# Patient Record
Sex: FEMALE
[De-identification: names, ages, dates, MRNs, and addresses within clinical notes are randomized per-mention and may not be internally consistent; named-entity substitution may affect disease eponyms.]

---

## 2020-02-20 ENCOUNTER — APPOINTMENT (OUTPATIENT)
Dept: INTERNAL MEDICINE | Facility: CLINIC | Age: 30
End: 2020-02-20
Payer: COMMERCIAL

## 2020-02-20 VITALS
SYSTOLIC BLOOD PRESSURE: 128 MMHG | OXYGEN SATURATION: 97 % | TEMPERATURE: 98.1 F | HEIGHT: 65 IN | WEIGHT: 238 LBS | HEART RATE: 74 BPM | DIASTOLIC BLOOD PRESSURE: 85 MMHG | BODY MASS INDEX: 39.65 KG/M2

## 2020-02-20 DIAGNOSIS — F50.9 EATING DISORDER, UNSPECIFIED: ICD-10-CM

## 2020-02-20 DIAGNOSIS — Z80.3 FAMILY HISTORY OF MALIGNANT NEOPLASM OF BREAST: ICD-10-CM

## 2020-02-20 DIAGNOSIS — Z86.39 PERSONAL HISTORY OF OTHER ENDOCRINE, NUTRITIONAL AND METABOLIC DISEASE: ICD-10-CM

## 2020-02-20 DIAGNOSIS — Z84.2 FAMILY HISTORY OF OTHER DISEASES OF THE GENITOURINARY SYSTEM: ICD-10-CM

## 2020-02-20 PROCEDURE — 36415 COLL VENOUS BLD VENIPUNCTURE: CPT

## 2020-02-20 PROCEDURE — 99204 OFFICE O/P NEW MOD 45 MIN: CPT | Mod: 25

## 2020-02-20 PROCEDURE — G0447 BEHAVIOR COUNSEL OBESITY 15M: CPT

## 2020-02-20 PROCEDURE — 99385 PREV VISIT NEW AGE 18-39: CPT | Mod: 25

## 2020-02-21 LAB
25(OH)D3 SERPL-MCNC: 23.2 NG/ML
ALBUMIN SERPL ELPH-MCNC: 4.6 G/DL
ALP BLD-CCNC: 86 U/L
ALT SERPL-CCNC: 26 U/L
ANION GAP SERPL CALC-SCNC: 13 MMOL/L
APPEARANCE: CLEAR
AST SERPL-CCNC: 17 U/L
BACTERIA: NEGATIVE
BASOPHILS # BLD AUTO: 0.03 K/UL
BASOPHILS NFR BLD AUTO: 0.3 %
BILIRUB SERPL-MCNC: 0.2 MG/DL
BILIRUBIN URINE: NEGATIVE
BLOOD URINE: NEGATIVE
BUN SERPL-MCNC: 17 MG/DL
CALCIUM SERPL-MCNC: 11 MG/DL
CHLORIDE SERPL-SCNC: 106 MMOL/L
CHOLEST SERPL-MCNC: 201 MG/DL
CHOLEST/HDLC SERPL: 3.4 RATIO
CO2 SERPL-SCNC: 22 MMOL/L
COLOR: YELLOW
CREAT SERPL-MCNC: 0.64 MG/DL
EOSINOPHIL # BLD AUTO: 0.14 K/UL
EOSINOPHIL NFR BLD AUTO: 1.5 %
ESTIMATED AVERAGE GLUCOSE: 111 MG/DL
GLUCOSE QUALITATIVE U: NEGATIVE
GLUCOSE SERPL-MCNC: 97 MG/DL
HBA1C MFR BLD HPLC: 5.5 %
HCT VFR BLD CALC: 47 %
HDLC SERPL-MCNC: 59 MG/DL
HGB BLD-MCNC: 14.6 G/DL
HYALINE CASTS: 1 /LPF
IMM GRANULOCYTES NFR BLD AUTO: 0.4 %
KETONES URINE: NEGATIVE
LDLC SERPL CALC-MCNC: 118 MG/DL
LEUKOCYTE ESTERASE URINE: NEGATIVE
LYMPHOCYTES # BLD AUTO: 2.16 K/UL
LYMPHOCYTES NFR BLD AUTO: 23.8 %
MAN DIFF?: NORMAL
MCHC RBC-ENTMCNC: 29.3 PG
MCHC RBC-ENTMCNC: 31.1 GM/DL
MCV RBC AUTO: 94.4 FL
MICROSCOPIC-UA: NORMAL
MONOCYTES # BLD AUTO: 0.71 K/UL
MONOCYTES NFR BLD AUTO: 7.8 %
NEUTROPHILS # BLD AUTO: 6.01 K/UL
NEUTROPHILS NFR BLD AUTO: 66.2 %
NITRITE URINE: NEGATIVE
PH URINE: 7
PLATELET # BLD AUTO: 252 K/UL
POTASSIUM SERPL-SCNC: 4.6 MMOL/L
PROT SERPL-MCNC: 7.1 G/DL
PROTEIN URINE: NORMAL
RBC # BLD: 4.98 M/UL
RBC # FLD: 11.9 %
RED BLOOD CELLS URINE: 5 /HPF
SODIUM SERPL-SCNC: 141 MMOL/L
SPECIFIC GRAVITY URINE: 1.03
SQUAMOUS EPITHELIAL CELLS: 5 /HPF
T3FREE SERPL-MCNC: 4.72 PG/ML
T4 FREE SERPL-MCNC: 1 NG/DL
TRIGL SERPL-MCNC: 120 MG/DL
TSH SERPL-ACNC: 0.02 UIU/ML
UROBILINOGEN URINE: NORMAL
VIT B12 SERPL-MCNC: 300 PG/ML
WBC # FLD AUTO: 9.09 K/UL
WHITE BLOOD CELLS URINE: 3 /HPF

## 2020-02-24 LAB
CALCIUM SERPL-MCNC: 11.3 MG/DL
HBV DNA # SERPL NAA+PROBE: NOT DETECTED
HBV SURFACE AB SER QL: NONREACTIVE
HBV SURFACE AG SER QL: REACTIVE
HCV AB SER QL: NONREACTIVE
HCV S/CO RATIO: 0.13 S/CO
HEPB DNA PCR LOG: NOT DETECTED LOG10IU/ML
PARATHYROID HORMONE INTACT: 105 PG/ML
PHOSPHATE SERPL-MCNC: 4.2 MG/DL
THYROGLOB AB SERPL-ACNC: <20 IU/ML
THYROPEROXIDASE AB SERPL IA-ACNC: <10 IU/ML
TSI ACT/NOR SER: <0.1 IU/L

## 2020-02-25 LAB
HBV E AB SER QL: NEGATIVE
HBV E AG SER QL: NEGATIVE

## 2020-02-26 ENCOUNTER — APPOINTMENT (OUTPATIENT)
Dept: INTERNAL MEDICINE | Facility: CLINIC | Age: 30
End: 2020-02-26

## 2020-03-04 NOTE — ASSESSMENT
[FreeTextEntry1] : check labs with comprehensive hep b\par continued wt loss\par [facility]--for dermoid cyst removal in 3-4 wks\par nutritional counseling\par \par   3/4/20--ADD--PRESURGICAL LABS AND ECG REVIEWED--NO MEDICAL CONTRAINDICATIONS TO PROCEDURE

## 2020-03-04 NOTE — COUNSELING
[Potential consequences of obesity discussed] : Potential consequences of obesity discussed [Benefits of weight loss discussed] : Benefits of weight loss discussed [Structured Weight Management Program suggested:] : Structured weight management program suggested [Encouraged to maintain food diary] : Encouraged to maintain food diary [Encouraged to increase physical activity] : Encouraged to increase physical activity [Encouraged to use exercise tracking device] : Encouraged to use exercise tracking device [Needs reinforcement, provided] : Patient needs reinforcement on understanding of disease, goals and obesity follow-up plan; reinforcement was provided [FreeTextEntry4] : 15

## 2020-03-04 NOTE — HISTORY OF PRESENT ILLNESS
[FreeTextEntry1] : cpx and medical clearance\par going for presugical labs in 2 wks--Dr. Sun--dermoid cyst--scheduled for  3/15 [de-identified] : Lost 50lbs since august--wt has plauteaued for 2 months--pt only eating 1 meal/day and exercising\par labs reviewed Dr Sun--tsh mildly low\par pt purges after eating\par chronic hep B?--states she was prescribed antiviral but didn’t take it by her prior IM--abd sono done but no liver bx\par pcos?

## 2020-03-04 NOTE — HEALTH RISK ASSESSMENT
[Fully functional (bathing, dressing, toileting, transferring, walking, feeding)] : Fully functional (bathing, dressing, toileting, transferring, walking, feeding) [Fully functional (using the telephone, shopping, preparing meals, housekeeping, doing laundry, using] : Fully functional and needs no help or supervision to perform IADLs (using the telephone, shopping, preparing meals, housekeeping, doing laundry, using transportation, managing medications and managing finances) [With Patient/Caregiver] : With Patient/Caregiver [AdvancecareDate] : 2/20

## 2020-04-29 ENCOUNTER — APPOINTMENT (OUTPATIENT)
Dept: ENDOCRINOLOGY | Facility: CLINIC | Age: 30
End: 2020-04-29
Payer: COMMERCIAL

## 2020-04-29 ENCOUNTER — APPOINTMENT (OUTPATIENT)
Dept: ENDOCRINOLOGY | Facility: CLINIC | Age: 30
End: 2020-04-29

## 2020-04-29 PROCEDURE — ZZZZZ: CPT

## 2020-05-15 ENCOUNTER — APPOINTMENT (OUTPATIENT)
Dept: ENDOCRINOLOGY | Facility: CLINIC | Age: 30
End: 2020-05-15
Payer: MEDICAID

## 2020-05-15 PROCEDURE — 98968 PH1 ASSMT&MGMT NQHP 21-30: CPT

## 2020-05-15 PROCEDURE — 97803 MED NUTRITION INDIV SUBSEQ: CPT

## 2020-05-22 ENCOUNTER — APPOINTMENT (OUTPATIENT)
Dept: ENDOCRINOLOGY | Facility: CLINIC | Age: 30
End: 2020-05-22
Payer: MEDICAID

## 2020-05-22 PROCEDURE — 98968 PH1 ASSMT&MGMT NQHP 21-30: CPT

## 2020-05-22 PROCEDURE — 97803 MED NUTRITION INDIV SUBSEQ: CPT

## 2020-05-29 ENCOUNTER — APPOINTMENT (OUTPATIENT)
Dept: ENDOCRINOLOGY | Facility: CLINIC | Age: 30
End: 2020-05-29

## 2020-06-03 ENCOUNTER — APPOINTMENT (OUTPATIENT)
Dept: ENDOCRINOLOGY | Facility: CLINIC | Age: 30
End: 2020-06-03

## 2020-06-03 ENCOUNTER — APPOINTMENT (OUTPATIENT)
Dept: ENDOCRINOLOGY | Facility: CLINIC | Age: 30
End: 2020-06-03
Payer: MEDICAID

## 2020-06-03 DIAGNOSIS — E34.9 ENDOCRINE DISORDER, UNSPECIFIED: ICD-10-CM

## 2020-06-03 DIAGNOSIS — E05.90 THYROTOXICOSIS, UNSPECIFIED W/OUT THYROTOXIC CRISIS OR STORM: ICD-10-CM

## 2020-06-03 DIAGNOSIS — Z78.9 OTHER SPECIFIED HEALTH STATUS: ICD-10-CM

## 2020-06-03 DIAGNOSIS — Z86.39 PERSONAL HISTORY OF OTHER ENDOCRINE, NUTRITIONAL AND METABOLIC DISEASE: ICD-10-CM

## 2020-06-03 DIAGNOSIS — Z83.3 FAMILY HISTORY OF DIABETES MELLITUS: ICD-10-CM

## 2020-06-03 DIAGNOSIS — E55.9 VITAMIN D DEFICIENCY, UNSPECIFIED: ICD-10-CM

## 2020-06-03 PROCEDURE — 99204 OFFICE O/P NEW MOD 45 MIN: CPT | Mod: 95

## 2020-06-04 PROBLEM — Z86.39 HISTORY OF OBESITY: Status: RESOLVED | Noted: 2020-06-04 | Resolved: 2020-06-04

## 2020-06-04 PROBLEM — E34.9 ELEVATED PARATHYROID HORMONE: Status: ACTIVE | Noted: 2020-06-04

## 2020-06-04 PROBLEM — E05.90 SUBCLINICAL HYPERTHYROIDISM: Status: ACTIVE | Noted: 2020-06-04

## 2020-06-04 PROBLEM — Z78.9 NON-SMOKER: Status: ACTIVE | Noted: 2020-02-20

## 2020-06-04 PROBLEM — E55.9 VITAMIN D DEFICIENCY: Status: ACTIVE | Noted: 2020-06-04

## 2020-06-04 PROBLEM — Z83.3 FAMILY HISTORY OF DIABETES MELLITUS: Status: ACTIVE | Noted: 2020-02-20

## 2020-06-04 RX ORDER — UBIDECARENONE/VIT E ACET 100MG-5
50 MCG CAPSULE ORAL
Qty: 30 | Refills: 4 | Status: ACTIVE | COMMUNITY
Start: 2020-06-04

## 2020-06-04 NOTE — HISTORY OF PRESENT ILLNESS
[FreeTextEntry1] : 29 yr F with obesity, +Hep B, PCOS referred for subclinical hyperthyroidism and hyperparathyroidism. \par No personal or known FH  of thyroid or parathyroid disorder. \par Feb 2020\par Vitamin D 25 OH 23\par FT4 1.0\par TSH 0.02\par TSI (-)\par \par Ca 11.0 Alb 4.6\par Does not take antacids or calcium supplements. Takes Vitamin D 2000IU daily. \par No heat intolerance, diarrhea or palpitations. Recently lost 50 pounds then gained 15 pounds back during quarantine.\par No falls/fractures. No hx of kidney stones. No nausea/vomiting/joint pains. No FH of calcium disorders.\par No dysphagia, dysphonia or change in neck size. No lithium/amiodarone/HCTZ use. No eye problems. No history of thyroid nodules. \par No neck pain. Patient states she has been taking biotin supplements for hair loss. \par

## 2020-06-04 NOTE — ASSESSMENT
[FreeTextEntry1] : 29 yr old F with Obesity, Hep B, PCOS referred for subclinical hyperthyroidism and high normal calcium (10.5) in setting of elevated PTH\par 1) Elevated PTH:\par May be elevated falsely due to biotin use\par Will check PTH, CMP, Mg, Phos and Vit D 25 OH after pt stops biotin for 1 week\par Will rule out FHH and check 24 hour Urine Calcium/Creatinine ratio\par If true elevation of PTH is found will check Renal US, BMD.\par 2) Subclinical hyperthyroidism\par May be falsely low due to biotin use\par Will check repeat TSH, FT4 after stoppage of biotin for 1 week\par Check Thyroid US\par 3) Vitamin D deficiency\par Continue Vit D 2000 IU daily\par 4) PCOS\par Mgmt as per GYN\par \par F/U in 6 weeks

## 2020-06-04 NOTE — REVIEW OF SYSTEMS
[Fatigue] : fatigue [Recent Weight Gain (___ Lbs)] : recent weight gain: [unfilled] lbs [All other systems negative] : All other systems negative [Nausea] : no nausea [Dysphagia] : no dysphagia [Dysphonia] : no dysphonia [Visual Field Defect] : no visual field defect [Joint Pain] : no joint pain [Headaches] : no headaches [Vomiting] : no vomiting [Tremors] : no tremors [Depression] : no depression [Polydipsia] : no polydipsia [Cold Intolerance] : no cold intolerance [Easy Bleeding] : no ~M tendency for easy bleeding [Heat Intolerance] : no heat intolerance [Easy Bruising] : no tendency for easy bruising

## 2020-07-22 ENCOUNTER — APPOINTMENT (OUTPATIENT)
Dept: ENDOCRINOLOGY | Facility: CLINIC | Age: 30
End: 2020-07-22

## 2020-08-25 ENCOUNTER — APPOINTMENT (OUTPATIENT)
Dept: INTERNAL MEDICINE | Facility: CLINIC | Age: 30
End: 2020-08-25

## 2020-08-26 ENCOUNTER — APPOINTMENT (OUTPATIENT)
Dept: ENDOCRINOLOGY | Facility: CLINIC | Age: 30
End: 2020-08-26

## 2020-08-26 ENCOUNTER — APPOINTMENT (OUTPATIENT)
Dept: INTERNAL MEDICINE | Facility: CLINIC | Age: 30
End: 2020-08-26
Payer: MEDICAID

## 2020-08-26 VITALS
WEIGHT: 258.13 LBS | OXYGEN SATURATION: 97 % | SYSTOLIC BLOOD PRESSURE: 129 MMHG | RESPIRATION RATE: 15 BRPM | DIASTOLIC BLOOD PRESSURE: 74 MMHG | HEIGHT: 65 IN | TEMPERATURE: 98.4 F | HEART RATE: 90 BPM | BODY MASS INDEX: 43.01 KG/M2

## 2020-08-26 DIAGNOSIS — R21 RASH AND OTHER NONSPECIFIC SKIN ERUPTION: ICD-10-CM

## 2020-08-26 DIAGNOSIS — E83.52 HYPERCALCEMIA: ICD-10-CM

## 2020-08-26 PROCEDURE — 36415 COLL VENOUS BLD VENIPUNCTURE: CPT

## 2020-08-26 PROCEDURE — 99213 OFFICE O/P EST LOW 20 MIN: CPT | Mod: 25

## 2020-08-26 NOTE — ASSESSMENT
[FreeTextEntry1] : prescribed pred taper\par addn labs for hepatitis\par derm if not better 3-4 days

## 2020-08-26 NOTE — PHYSICAL EXAM
[Normal] : no respiratory distress, lungs were clear to auscultation bilaterally and no accessory muscle use [de-identified] : fine macular pap rash over trunk and extremities--no hives

## 2020-08-26 NOTE — HISTORY OF PRESENT ILLNESS
[FreeTextEntry1] : rash [de-identified] : pruritic rash for 1 wk--genearlized\par had hair permed 24hrs before rash started--no other new meds or exposures--had exposure to a friends dog a week prior\par given oral steroid by UC on 8/25 but didn’t take it\par reviewed endo notes\par pt now states she take antiviral for hepatitis given by her prior MD--partial course

## 2020-08-27 LAB
HAV IGM SER QL: NONREACTIVE
HBV CORE IGG+IGM SER QL: REACTIVE
HBV CORE IGM SER QL: NONREACTIVE
HBV SURFACE AB SER QL: NONREACTIVE
HBV SURFACE AG SER QL: REACTIVE
HEPATITIS A IGG ANTIBODY: REACTIVE
INR PPP: 0.9 RATIO
PT BLD: 10.8 SEC

## 2020-12-11 ENCOUNTER — APPOINTMENT (OUTPATIENT)
Dept: INTERNAL MEDICINE | Facility: CLINIC | Age: 30
End: 2020-12-11
Payer: MEDICAID

## 2020-12-11 VITALS
WEIGHT: 237.31 LBS | BODY MASS INDEX: 39.54 KG/M2 | HEART RATE: 85 BPM | DIASTOLIC BLOOD PRESSURE: 83 MMHG | HEIGHT: 65 IN | OXYGEN SATURATION: 99 % | SYSTOLIC BLOOD PRESSURE: 118 MMHG | TEMPERATURE: 98.7 F

## 2020-12-11 PROCEDURE — 99213 OFFICE O/P EST LOW 20 MIN: CPT

## 2020-12-11 PROCEDURE — 99072 ADDL SUPL MATRL&STAF TM PHE: CPT

## 2020-12-11 NOTE — ASSESSMENT
[FreeTextEntry1] : pt to get further info on diet history and give to provider for letter of neccesity\par referral to cardio, pulm ,gi\par f/u 1 month for reweigh

## 2020-12-11 NOTE — HISTORY OF PRESENT ILLNESS
[FreeTextEntry1] : obesity [de-identified] : saw dr Goodman --bariatric surgeon\par req letter of medical necessity for obesity surgery--[phone]\par fax--[phone]\par pt has had multiple unsucc. attempts at wt loss

## 2020-12-14 ENCOUNTER — APPOINTMENT (OUTPATIENT)
Dept: PULMONOLOGY | Facility: CLINIC | Age: 30
End: 2020-12-14
Payer: MEDICAID

## 2020-12-14 VITALS
OXYGEN SATURATION: 100 % | HEART RATE: 88 BPM | BODY MASS INDEX: 44.15 KG/M2 | SYSTOLIC BLOOD PRESSURE: 138 MMHG | RESPIRATION RATE: 15 BRPM | DIASTOLIC BLOOD PRESSURE: 75 MMHG | HEIGHT: 65 IN | TEMPERATURE: 98.9 F | WEIGHT: 265 LBS

## 2020-12-14 DIAGNOSIS — R06.83 SNORING: ICD-10-CM

## 2020-12-14 DIAGNOSIS — Z23 ENCOUNTER FOR IMMUNIZATION: ICD-10-CM

## 2020-12-14 PROCEDURE — 99072 ADDL SUPL MATRL&STAF TM PHE: CPT

## 2020-12-14 PROCEDURE — 99204 OFFICE O/P NEW MOD 45 MIN: CPT

## 2020-12-14 RX ORDER — PREDNISONE 20 MG/1
20 TABLET ORAL
Qty: 10 | Refills: 0 | Status: DISCONTINUED | COMMUNITY
Start: 2020-08-26 | End: 2020-12-14

## 2020-12-14 RX ORDER — FLUTICASONE PROPIONATE 50 UG/1
50 SPRAY, METERED NASAL
Qty: 16 | Refills: 0 | Status: DISCONTINUED | COMMUNITY
Start: 2020-11-06

## 2020-12-14 RX ORDER — METHYLPREDNISOLONE 4 MG/1
4 TABLET ORAL
Qty: 21 | Refills: 0 | Status: DISCONTINUED | COMMUNITY
Start: 2020-09-11

## 2020-12-14 NOTE — PHYSICAL EXAM
[No Acute Distress] : no acute distress [Normal Oropharynx] : normal oropharynx [IV] : Mallampati Class: IV [Normal Appearance] : normal appearance [No Neck Mass] : no neck mass [Normal Rate/Rhythm] : normal rate/rhythm [Normal S1, S2] : normal s1, s2 [No Murmurs] : no murmurs [No Resp Distress] : no resp distress [Clear to Auscultation Bilaterally] : clear to auscultation bilaterally [No Abnormalities] : no abnormalities [Benign] : benign [Normal Gait] : normal gait [No Clubbing] : no clubbing [No Cyanosis] : no cyanosis [No Edema] : no edema [FROM] : FROM [Normal Color/ Pigmentation] : normal color/ pigmentation [No Focal Deficits] : no focal deficits [Oriented x3] : oriented x3 [Normal Affect] : normal affect [TextBox_2] : obese

## 2020-12-14 NOTE — HISTORY OF PRESENT ILLNESS
[Awakes Unrefreshed] : awakes unrefreshed [Awakes with Dry Mouth] : awakes with dry mouth [Awakes with Headache] : awakes with headache [Daytime Somnolence] : daytime somnolence [Fatigue] : fatigue [Recent  Weight Gain] : recent  weight gain [Snoring] : snoring [Tired while Driving] : tired while driving [DMS] : difficulty maintaining sleep [Never] : never [TextBox_4] : Ms. NIKOLAS GOLDMAN is a 29 year old woman with DM and obesity here for evaluation prior to bariatric surgery. \par \par Patient denies any pulmonary sx - no hx of asthma, wheezing, no SOB, no chest tightness. \par Does have s/s of sleep disordered breathing.  \par \par PMH: obesity; DM\par Meds: Metformin\par All: NKDA\par SH: not a smoker\par FH grandparents have DM; dad - obesity, DM\par PMD: COMPA MANRIQUE\par Immunizations: needs to get at the pharmacy\par  [ESS] : 7

## 2020-12-14 NOTE — ASSESSMENT
[FreeTextEntry1] : 28 yo with obesity here for evaluation prior to bariatric surgery (no scheduled date - planned for about 3 months from now). \par The patient has multiple signs and symptoms of sleep-disordered breathing. She was referred to the [facility] for a diagnostic HST. The ramifications of ERIC and its potential therapeutic modalities were discussed with the patient. The patient was cautioned on the importance of avoiding drowsy driving.\par Will schedule PFTs\par \par I will contact patient after sleep study and PFTs.\par \par All questions answered. Patient in agreement with plan. \par \par \par

## 2020-12-14 NOTE — CONSULT LETTER
[Dear  ___] : Dear  [unfilled], [Consult Letter:] : I had the pleasure of evaluating your patient, [unfilled]. [Please see my note below.] : Please see my note below. [Sincerely,] : Sincerely, [FreeTextEntry3] : Sincerely,\par \par Nalini Cavazos MD\par [facility]\par Pulmonary Medicine\par

## 2020-12-18 ENCOUNTER — APPOINTMENT (OUTPATIENT)
Dept: CARDIOLOGY | Facility: CLINIC | Age: 30
End: 2020-12-18
Payer: MEDICAID

## 2020-12-18 ENCOUNTER — NON-APPOINTMENT (OUTPATIENT)
Age: 30
End: 2020-12-18

## 2020-12-18 VITALS
WEIGHT: 263 LBS | TEMPERATURE: 98.7 F | BODY MASS INDEX: 43.82 KG/M2 | SYSTOLIC BLOOD PRESSURE: 132 MMHG | DIASTOLIC BLOOD PRESSURE: 84 MMHG | OXYGEN SATURATION: 99 % | HEIGHT: 65 IN | HEART RATE: 86 BPM

## 2020-12-18 PROCEDURE — 99072 ADDL SUPL MATRL&STAF TM PHE: CPT

## 2020-12-18 PROCEDURE — 99204 OFFICE O/P NEW MOD 45 MIN: CPT

## 2020-12-18 PROCEDURE — 93000 ELECTROCARDIOGRAM COMPLETE: CPT

## 2020-12-18 NOTE — HISTORY OF PRESENT ILLNESS
[FreeTextEntry1] : 29F presents to establish cardiovascular care\par Sent in by PMD: Dr Rahul Singh\par \par pt is planning for bariatric surgery\par \par pt overall feeling well, denies cp and sob, at rest or on exertion, denies palpitations dizziness syncope, LE edema, orthopnea. \par pt exercises, does StairMaster, several times a week, denies sob, cp, states her exercise tolerance has been improving\par pt currently doing intermittent fasting, states it has helped her maintain her weight, but she has not lost weight on it, but also states that she needs to make better choices at what she eats. \par \par pt had covid several months ago. guerra not know if she has antibodies or not. \par \par Prior cardiac workup: none\par Recent labs: 2/2020: a1c 5.5 Cr 0.64 tot chol 201 tg 120 hdl 59 ldl 118 tsh 0.02\par \par Med hx: PCOS, hepatitis B, BMI 43\par OBGYN hx: does not have children.irregular menstrual cycles\par Sx hx: ovarian cyst removal\par Fam hx: no known cardiac hx\par Social hx: lives in [place], not currently working, denies tob, rare etoh, denies drug use. \par Meds: metformin\par Allergies: nkda\par \par

## 2020-12-18 NOTE — PHYSICAL EXAM
[General Appearance - Well Developed] : well developed [Normal Appearance] : normal appearance [General Appearance - Well Nourished] : well nourished [FreeTextEntry1] : jvd not appreciated [Heart Rate And Rhythm] : heart rate and rhythm were normal [Heart Sounds] : normal S1 and S2 [Murmurs] : no murmurs present [Edema] : no peripheral edema present [] : no respiratory distress [Respiration, Rhythm And Depth] : normal respiratory rhythm and effort [Auscultation Breath Sounds / Voice Sounds] : lungs were clear to auscultation bilaterally [Bowel Sounds] : normal bowel sounds [Abdomen Soft] : soft [Abdomen Tenderness] : non-tender [Abnormal Walk] : normal gait [Skin Turgor] : normal skin turgor [Oriented To Time, Place, And Person] : oriented to person, place, and time [Impaired Insight] : insight and judgment were intact [Affect] : the affect was normal [Mood] : the mood was normal

## 2020-12-18 NOTE — DISCUSSION/SUMMARY
[FreeTextEntry1] : 29F with hypothyroidism, BMI 43, presents to establish cardiovascular care\par \par 1. elevated BMI\par -BP stable, pt trying to eat healthier, exercise more\par -pt is planning on bariatric surgery, does not have a scheduled date yet, states she is in the preliminary stages\par -d/w that she will likely require pre-operative testing, likely TTE and stress test\par -pt states she wants to wait until she has a date prior to scheduling\par \par \par f/u prior to procedure.

## 2020-12-22 ENCOUNTER — NON-APPOINTMENT (OUTPATIENT)
Age: 30
End: 2020-12-22

## 2021-01-08 ENCOUNTER — OUTPATIENT (OUTPATIENT)
Dept: OUTPATIENT SERVICES | Facility: HOSPITAL | Age: 31
LOS: 1 days | End: 2021-01-08
Payer: MEDICAID

## 2021-01-08 ENCOUNTER — APPOINTMENT (OUTPATIENT)
Dept: SLEEP CENTER | Facility: CLINIC | Age: 31
End: 2021-01-08
Payer: MEDICAID

## 2021-01-08 PROCEDURE — 95806 SLEEP STUDY UNATT&RESP EFFT: CPT

## 2021-01-08 PROCEDURE — 95806 SLEEP STUDY UNATT&RESP EFFT: CPT | Mod: 26

## 2021-01-14 DIAGNOSIS — G47.33 OBSTRUCTIVE SLEEP APNEA (ADULT) (PEDIATRIC): ICD-10-CM

## 2021-01-15 ENCOUNTER — APPOINTMENT (OUTPATIENT)
Dept: CARDIOLOGY | Facility: CLINIC | Age: 31
End: 2021-01-15
Payer: MEDICAID

## 2021-01-15 VITALS
OXYGEN SATURATION: 99 % | DIASTOLIC BLOOD PRESSURE: 76 MMHG | BODY MASS INDEX: 44.82 KG/M2 | HEIGHT: 65 IN | RESPIRATION RATE: 16 BRPM | SYSTOLIC BLOOD PRESSURE: 119 MMHG | WEIGHT: 269 LBS | TEMPERATURE: 98.8 F | HEART RATE: 85 BPM

## 2021-01-15 DIAGNOSIS — Z00.00 ENCOUNTER FOR GENERAL ADULT MEDICAL EXAMINATION W/OUT ABNORMAL FINDINGS: ICD-10-CM

## 2021-01-15 PROCEDURE — 99072 ADDL SUPL MATRL&STAF TM PHE: CPT

## 2021-01-15 PROCEDURE — 99214 OFFICE O/P EST MOD 30 MIN: CPT

## 2021-01-15 NOTE — HISTORY OF PRESENT ILLNESS
Blood Sent    [FreeTextEntry1] : 30F presents for f/u\par PMD: Dr Rahul Singh\par \par \par \par pt last seen in cardiology for an initial eval on 12/18/2020 to discuss a potential bariatric surgery. at that time pt was feeling well without CP sob at rest of on exertion. \par \par today pt presents for f/u. pt overall feeling well, denies cp and sob, at rest or on exertion, denies palpitations dizziness syncope, LE edema, orthopnea. \par pt exercises, does StairMaster, spinning, several times a week, denies sob, cp, states her exercise tolerance has been improving\par pt currently trying to eliminate carbs from her diet, prev was doing intermittent fasting.\par pt now has a date for her bariatric surgery, for march and wants to discuss pre-op evaluation. \par \par pt had covid several months ago. does not know if she has antibodies or not. \par \par Prior cardiac workup: none\par Recent labs: 2/2020: a1c 5.5 Cr 0.64 tot chol 201 tg 120 hdl 59 ldl 118 tsh 0.02\par \par Med hx: PCOS, hepatitis B, BMI 43\par OBGYN hx: does not have children.irregular menstrual cycles\par Sx hx: ovarian cyst removal\par Fam hx: no known cardiac hx\par Social hx: lives in [place], not currently working, denies tob, rare etoh, denies drug use. \par Meds: metformin\par Allergies: nkda\par

## 2021-01-15 NOTE — DISCUSSION/SUMMARY
[FreeTextEntry1] : 30F with hypothyroidism, BMI 43, presents for f/u\par \par 1. elevated BMI\par -BP stable, pt trying to eat healthier, exercise more\par -pt is planning on bariatric surgery, requires tte and stress\par \par \par

## 2021-01-15 NOTE — PHYSICAL EXAM
[General Appearance - Well Developed] : well developed [Normal Appearance] : normal appearance [General Appearance - Well Nourished] : well nourished [FreeTextEntry1] : jvd not appreciated [] : no respiratory distress [Respiration, Rhythm And Depth] : normal respiratory rhythm and effort [Auscultation Breath Sounds / Voice Sounds] : lungs were clear to auscultation bilaterally [Heart Rate And Rhythm] : heart rate and rhythm were normal [Murmurs] : no murmurs present [Heart Sounds] : normal S1 and S2 [Edema] : no peripheral edema present [Bowel Sounds] : normal bowel sounds [Abdomen Soft] : soft [Abdomen Tenderness] : non-tender [Abnormal Walk] : normal gait [Skin Turgor] : normal skin turgor [Oriented To Time, Place, And Person] : oriented to person, place, and time [Impaired Insight] : insight and judgment were intact [Affect] : the affect was normal [Mood] : the mood was normal

## 2021-01-20 ENCOUNTER — APPOINTMENT (OUTPATIENT)
Dept: PULMONOLOGY | Facility: CLINIC | Age: 31
End: 2021-01-20

## 2021-01-31 DIAGNOSIS — Z01.818 ENCOUNTER FOR OTHER PREPROCEDURAL EXAMINATION: ICD-10-CM

## 2021-02-02 ENCOUNTER — APPOINTMENT (OUTPATIENT)
Dept: CARDIOLOGY | Facility: CLINIC | Age: 31
End: 2021-02-02
Payer: MEDICAID

## 2021-02-02 ENCOUNTER — APPOINTMENT (OUTPATIENT)
Dept: DISASTER EMERGENCY | Facility: CLINIC | Age: 31
End: 2021-02-02

## 2021-02-02 ENCOUNTER — APPOINTMENT (OUTPATIENT)
Dept: PULMONOLOGY | Facility: CLINIC | Age: 31
End: 2021-02-02
Payer: MEDICAID

## 2021-02-02 VITALS — SYSTOLIC BLOOD PRESSURE: 128 MMHG | HEART RATE: 94 BPM | DIASTOLIC BLOOD PRESSURE: 88 MMHG

## 2021-02-02 PROCEDURE — 93015 CV STRESS TEST SUPVJ I&R: CPT

## 2021-02-02 PROCEDURE — 99213 OFFICE O/P EST LOW 20 MIN: CPT | Mod: 25

## 2021-02-02 PROCEDURE — 93306 TTE W/DOPPLER COMPLETE: CPT

## 2021-02-02 PROCEDURE — 99072 ADDL SUPL MATRL&STAF TM PHE: CPT

## 2021-02-02 PROCEDURE — 99204 OFFICE O/P NEW MOD 45 MIN: CPT | Mod: 95

## 2021-02-02 NOTE — REVIEW OF SYSTEMS
[EDS: ESS=____] : daytime somnolence: ESS=[unfilled] [Snoring] : snoring [Witnessed Apneas] : witnessed apnea [A.M. Dry Mouth] : a.m. dry mouth [Obesity] : obesity [A.M. Headache] : headache present upon awakening [Difficulty Maintaining Sleep] : difficulty maintaining sleep [Negative] : Psychiatric [Fatigue] : no fatigue [Leg Dysesthesias] : no leg dysesthesias [Difficulty Initiating Sleep] : no difficulty falling asleep [Lower Extremity Discomfort] : no lower extremity discomfort [Irresistible urge to move legs] : no irresistible urge to move legs because of lower extremity discomfort [Late day/ Evening symptoms] : no late day/evening symptoms [Sleep Disturbances due to LE symptoms] : ~T no sleep disturbances due to lower extremity symptoms [Unusual Sleep Behavior] : no unusual sleep behavior [Hypersomnolence] : not sleeping much more than usual [Cataplexy] :  no cataplexy [Hypnogogic Hallucinations] : no hypnogogic hallucinations [Hypnopompic Hallucinations] : no hypnopompic hallucinations [Sleep Paralysis] : no sleep paralysis

## 2021-02-02 NOTE — ASSESSMENT
[FreeTextEntry1] : Ms. James is a 30 year old female here for preop evaluation for gastric sleeve surgery and for treatment of her ERIC. \par \par We discussed results of HST which showed an MANDA of 41/hr. She is agreeable for treatment. Equipment will be ordered through the DME provider. The compliance criteria were discussed. She was explained why she would benefit from treatment prior to her surgery and help her preoperatively. The order will be rushed. \par \par Patient with low risk for pulmonary complications but will need PFTs to further evaluate in order to clear her for surgery. \par \par I have asked her to see me March 3rd which gives her surgeon 1 week notice for clearance. She will perform PFTs during that day or before. \par

## 2021-02-02 NOTE — PHYSICAL EXAM
[General Appearance - Well Developed] : well developed [Normal Appearance] : normal appearance [General Appearance - Well Nourished] : well nourished [Oriented To Time, Place, And Person] : oriented to person, place, and time [Impaired Insight] : insight and judgment were intact [Affect] : the affect was normal

## 2021-02-02 NOTE — HISTORY OF PRESENT ILLNESS
[Home] : at home, [unfilled] , at the time of the visit. [Other Location: e.g. Home (Enter Location, City,State)___] : at [unfilled] [Verbal consent obtained from patient] : the patient, [unfilled] [Obstructive Sleep Apnea] : obstructive sleep apnea [Snoring] : snoring [Witnessed Apneas] : witnessed sleep apnea [Frequent Nocturnal Awakening] : frequent nocturnal awakening [ESS: ___] : ESS score [unfilled] [Awakes Unrefreshed] : awakening unrefreshed [Awakening With Dry Mouth] : awakening with dry mouth [Recent  Weight Gain] : recent weight gain [DMS] : DMS [Date: ___] : Date of most recent diagnostic polysomnogram: [unfilled] [AHI: ___ per hour] : Apnea-hypopnea index:  [unfilled] per hour [FreeTextEntry1] : Ms. James is a 30 year old female with a history of class III obesity and ERIC who comes in for preoperative evaluation and treatment of ERIC. Per the patietn she was offered an HST for clearance and she was found to have severe ERIC. She does have loud snoring, nonrestorative sleep, and frequent nocturnal awakenings. She is scheduled to receive her gastric sleeve surgery by Dr. Yury Sanchez on March 10th 2021. \par \par In terms of her pulmonary status she is a current nonsmoker. She denies any exercise intolerance. She had minor surgery before (ovarian cysts) but had not complications with anesthesia. \par \par Job: Unemployed, Unemployed \par \par ______________________________________________________________________________________ \par EPWORTH SLEEPINESS SCALE \par How likely are you to doze off or fall asleep in the situations described below, in contrast to feeling just tired? \par This refers to your usual way of life in recent times. \par Even if you haven't done some of these things recently, try to work out how they would have affected you. \par Use the following scale to choose one most appropriate number for each situation. \par \par 0 = Never would doze \par 1 = Slight chance of dozing \par 2 = Moderate chance of dozing \par 3 = High chance of dozing \par \par  \par 1........................................Sitting and reading \par 1........................................Watching TV \par 1........................................Sitting inactive in a public place (eg a theatre or a meeting) \par 0........................................As a passenger in a car for an hour without a break \par 3........................................Lying down to rest in the afternoon when circumstances permit \par 0........................................Sitting and talking to someone \par 0........................................Sitting quietly after lunch without alcohol \par 0........................................In a car, while stopped for a few minutes in traffic \par 6........................................TOTAL SCORE \par ______________________________________________________________________________________ \par \par  [Daytime Somnolence] : no daytime somnolence [Unintentional Sleep while Active] : no unintentional sleep while active [Unintentional Sleep While Inactive] : no unintentional sleep while inactive [Awakes with Headache] : no headache upon awakening [DIS] : no DIS [Unusual Sleep Behavior] : no unusual sleep behavior [Hypersomnolence] : no hypersomnolence [Cataplexy] : no cataplexy [Sleep Paralysis] : no sleep paralysis [Hypnagogic Hallucinations] : no hallucinations when falling asleep [Hypnopompic Hallucinations] : no hallucinations when awakening

## 2021-02-02 NOTE — REASON FOR VISIT
[Initial Evaluation] : an initial evaluation [Sleep Apnea] : sleep apnea [FreeTextEntry2] : preoperative evaluation

## 2021-02-03 LAB — SARS-COV-2 N GENE NPH QL NAA+PROBE: NOT DETECTED

## 2021-02-04 ENCOUNTER — APPOINTMENT (OUTPATIENT)
Dept: PULMONOLOGY | Facility: CLINIC | Age: 31
End: 2021-02-04
Payer: MEDICAID

## 2021-02-04 PROCEDURE — 94010 BREATHING CAPACITY TEST: CPT

## 2021-02-04 PROCEDURE — 94729 DIFFUSING CAPACITY: CPT

## 2021-02-04 PROCEDURE — 94726 PLETHYSMOGRAPHY LUNG VOLUMES: CPT

## 2021-02-04 PROCEDURE — 99072 ADDL SUPL MATRL&STAF TM PHE: CPT

## 2021-02-04 NOTE — ASU PATIENT PROFILE, ADULT - PMH
Hepatitis    Obesity    ERIC (obstructive sleep apnea)     Hepatitis    Obesity    ERIC (obstructive sleep apnea)    Polycystic ovarian disease

## 2021-02-05 ENCOUNTER — OUTPATIENT (OUTPATIENT)
Dept: OUTPATIENT SERVICES | Facility: HOSPITAL | Age: 31
LOS: 1 days | Discharge: HOME | End: 2021-02-05
Payer: MEDICAID

## 2021-02-05 ENCOUNTER — RESULT REVIEW (OUTPATIENT)
Age: 31
End: 2021-02-05

## 2021-02-05 VITALS
OXYGEN SATURATION: 100 % | HEIGHT: 64 IN | TEMPERATURE: 98 F | WEIGHT: 266.1 LBS | DIASTOLIC BLOOD PRESSURE: 81 MMHG | RESPIRATION RATE: 14 BRPM | HEART RATE: 83 BPM | SYSTOLIC BLOOD PRESSURE: 132 MMHG

## 2021-02-05 DIAGNOSIS — K21.9 GASTRO-ESOPHAGEAL REFLUX DISEASE WITHOUT ESOPHAGITIS: ICD-10-CM

## 2021-02-05 DIAGNOSIS — E66.01 MORBID (SEVERE) OBESITY DUE TO EXCESS CALORIES: ICD-10-CM

## 2021-02-05 DIAGNOSIS — N83.209 UNSPECIFIED OVARIAN CYST, UNSPECIFIED SIDE: Chronic | ICD-10-CM

## 2021-02-05 LAB — HCG UR QL: NEGATIVE — SIGNIFICANT CHANGE UP

## 2021-02-05 PROCEDURE — 88342 IMHCHEM/IMCYTCHM 1ST ANTB: CPT

## 2021-02-05 PROCEDURE — 88313 SPECIAL STAINS GROUP 2: CPT

## 2021-02-05 PROCEDURE — 88313 SPECIAL STAINS GROUP 2: CPT | Mod: 26

## 2021-02-05 PROCEDURE — 88305 TISSUE EXAM BY PATHOLOGIST: CPT

## 2021-02-05 PROCEDURE — 88312 SPECIAL STAINS GROUP 1: CPT

## 2021-02-05 PROCEDURE — 88305 TISSUE EXAM BY PATHOLOGIST: CPT | Mod: 26

## 2021-02-05 PROCEDURE — 88312 SPECIAL STAINS GROUP 1: CPT | Mod: 26

## 2021-02-05 PROCEDURE — 81025 URINE PREGNANCY TEST: CPT

## 2021-02-05 PROCEDURE — 88342 IMHCHEM/IMCYTCHM 1ST ANTB: CPT | Mod: 26

## 2021-02-11 DIAGNOSIS — Z01.818 ENCOUNTER FOR OTHER PREPROCEDURAL EXAMINATION: ICD-10-CM

## 2021-02-11 DIAGNOSIS — K29.50 UNSPECIFIED CHRONIC GASTRITIS WITHOUT BLEEDING: ICD-10-CM

## 2021-02-11 DIAGNOSIS — E66.9 OBESITY, UNSPECIFIED: ICD-10-CM

## 2021-02-11 DIAGNOSIS — K20.90 ESOPHAGITIS, UNSPECIFIED WITHOUT BLEEDING: ICD-10-CM

## 2021-02-11 DIAGNOSIS — B96.81 HELICOBACTER PYLORI [H. PYLORI] AS THE CAUSE OF DISEASES CLASSIFIED ELSEWHERE: ICD-10-CM

## 2021-02-11 DIAGNOSIS — G47.33 OBSTRUCTIVE SLEEP APNEA (ADULT) (PEDIATRIC): ICD-10-CM

## 2021-02-11 DIAGNOSIS — Z79.84 LONG TERM (CURRENT) USE OF ORAL HYPOGLYCEMIC DRUGS: ICD-10-CM

## 2021-02-11 DIAGNOSIS — E28.2 POLYCYSTIC OVARIAN SYNDROME: ICD-10-CM

## 2021-02-15 PROBLEM — E66.9 OBESITY, UNSPECIFIED: Chronic | Status: ACTIVE | Noted: 2021-02-05

## 2021-02-15 PROBLEM — K75.9 INFLAMMATORY LIVER DISEASE, UNSPECIFIED: Chronic | Status: ACTIVE | Noted: 2021-02-04

## 2021-02-19 ENCOUNTER — APPOINTMENT (OUTPATIENT)
Dept: GASTROENTEROLOGY | Facility: CLINIC | Age: 31
End: 2021-02-19

## 2021-03-02 ENCOUNTER — OUTPATIENT (OUTPATIENT)
Dept: OUTPATIENT SERVICES | Facility: HOSPITAL | Age: 31
LOS: 1 days | End: 2021-03-02
Payer: MEDICAID

## 2021-03-02 ENCOUNTER — RESULT REVIEW (OUTPATIENT)
Age: 31
End: 2021-03-02

## 2021-03-02 VITALS
HEART RATE: 68 BPM | SYSTOLIC BLOOD PRESSURE: 121 MMHG | RESPIRATION RATE: 18 BRPM | HEIGHT: 64 IN | TEMPERATURE: 98 F | WEIGHT: 250 LBS | OXYGEN SATURATION: 98 % | DIASTOLIC BLOOD PRESSURE: 83 MMHG

## 2021-03-02 DIAGNOSIS — E66.01 MORBID (SEVERE) OBESITY DUE TO EXCESS CALORIES: ICD-10-CM

## 2021-03-02 DIAGNOSIS — N83.209 UNSPECIFIED OVARIAN CYST, UNSPECIFIED SIDE: Chronic | ICD-10-CM

## 2021-03-02 DIAGNOSIS — Z29.9 ENCOUNTER FOR PROPHYLACTIC MEASURES, UNSPECIFIED: ICD-10-CM

## 2021-03-02 LAB
A1C WITH ESTIMATED AVERAGE GLUCOSE RESULT: 5.6 % — SIGNIFICANT CHANGE UP (ref 4–5.6)
ALBUMIN SERPL ELPH-MCNC: 4.3 G/DL — SIGNIFICANT CHANGE UP (ref 3.3–5)
ANION GAP SERPL CALC-SCNC: 6 MMOL/L — SIGNIFICANT CHANGE UP (ref 5–17)
APPEARANCE UR: CLEAR — SIGNIFICANT CHANGE UP
APTT BLD: 34.3 SEC — SIGNIFICANT CHANGE UP (ref 27.5–35.5)
BASOPHILS # BLD AUTO: 0.02 K/UL — SIGNIFICANT CHANGE UP (ref 0–0.2)
BASOPHILS NFR BLD AUTO: 0.4 % — SIGNIFICANT CHANGE UP (ref 0–2)
BILIRUB UR-MCNC: NEGATIVE — SIGNIFICANT CHANGE UP
BLOOD GAS SOURCE: SIGNIFICANT CHANGE UP
BUN SERPL-MCNC: 9 MG/DL — SIGNIFICANT CHANGE UP (ref 7–23)
CALCIUM SERPL-MCNC: 10.3 MG/DL — HIGH (ref 8.5–10.1)
CHLORIDE SERPL-SCNC: 107 MMOL/L — SIGNIFICANT CHANGE UP (ref 96–108)
CO2 SERPL-SCNC: 27 MMOL/L — SIGNIFICANT CHANGE UP (ref 22–31)
COHGB MFR BLDV: 1.6 % — HIGH (ref 0–1.5)
COLOR SPEC: YELLOW — SIGNIFICANT CHANGE UP
CREAT SERPL-MCNC: 0.64 MG/DL — SIGNIFICANT CHANGE UP (ref 0.5–1.3)
DIFF PNL FLD: NEGATIVE — SIGNIFICANT CHANGE UP
EOSINOPHIL # BLD AUTO: 0.1 K/UL — SIGNIFICANT CHANGE UP (ref 0–0.5)
EOSINOPHIL NFR BLD AUTO: 1.8 % — SIGNIFICANT CHANGE UP (ref 0–6)
ESTIMATED AVERAGE GLUCOSE: 114 MG/DL — SIGNIFICANT CHANGE UP (ref 68–114)
GLUCOSE SERPL-MCNC: 97 MG/DL — SIGNIFICANT CHANGE UP (ref 70–99)
GLUCOSE UR QL: NEGATIVE MG/DL — SIGNIFICANT CHANGE UP
HCT VFR BLD CALC: 44.5 % — SIGNIFICANT CHANGE UP (ref 34.5–45)
HGB BLD-MCNC: 14.7 G/DL — SIGNIFICANT CHANGE UP (ref 11.5–15.5)
IMM GRANULOCYTES NFR BLD AUTO: 0.4 % — SIGNIFICANT CHANGE UP (ref 0–1.5)
INR BLD: 1.02 RATIO — SIGNIFICANT CHANGE UP (ref 0.88–1.16)
KETONES UR-MCNC: ABNORMAL
LEUKOCYTE ESTERASE UR-ACNC: NEGATIVE — SIGNIFICANT CHANGE UP
LYMPHOCYTES # BLD AUTO: 1.35 K/UL — SIGNIFICANT CHANGE UP (ref 1–3.3)
LYMPHOCYTES # BLD AUTO: 23.9 % — SIGNIFICANT CHANGE UP (ref 13–44)
MCHC RBC-ENTMCNC: 29.2 PG — SIGNIFICANT CHANGE UP (ref 27–34)
MCHC RBC-ENTMCNC: 33 GM/DL — SIGNIFICANT CHANGE UP (ref 32–36)
MCV RBC AUTO: 88.3 FL — SIGNIFICANT CHANGE UP (ref 80–100)
MONOCYTES # BLD AUTO: 0.43 K/UL — SIGNIFICANT CHANGE UP (ref 0–0.9)
MONOCYTES NFR BLD AUTO: 7.6 % — SIGNIFICANT CHANGE UP (ref 2–14)
NEUTROPHILS # BLD AUTO: 3.74 K/UL — SIGNIFICANT CHANGE UP (ref 1.8–7.4)
NEUTROPHILS NFR BLD AUTO: 65.9 % — SIGNIFICANT CHANGE UP (ref 43–77)
NITRITE UR-MCNC: NEGATIVE — SIGNIFICANT CHANGE UP
PH UR: 6 — SIGNIFICANT CHANGE UP (ref 5–8)
PLATELET # BLD AUTO: 198 K/UL — SIGNIFICANT CHANGE UP (ref 150–400)
POTASSIUM SERPL-MCNC: 3.8 MMOL/L — SIGNIFICANT CHANGE UP (ref 3.5–5.3)
POTASSIUM SERPL-SCNC: 3.8 MMOL/L — SIGNIFICANT CHANGE UP (ref 3.5–5.3)
PROT UR-MCNC: NEGATIVE MG/DL — SIGNIFICANT CHANGE UP
PROTHROM AB SERPL-ACNC: 11.8 SEC — SIGNIFICANT CHANGE UP (ref 10.6–13.6)
RBC # BLD: 5.04 M/UL — SIGNIFICANT CHANGE UP (ref 3.8–5.2)
RBC # FLD: 12 % — SIGNIFICANT CHANGE UP (ref 10.3–14.5)
SODIUM SERPL-SCNC: 140 MMOL/L — SIGNIFICANT CHANGE UP (ref 135–145)
SP GR SPEC: 1.01 — SIGNIFICANT CHANGE UP (ref 1.01–1.02)
UROBILINOGEN FLD QL: NEGATIVE MG/DL — SIGNIFICANT CHANGE UP
WBC # BLD: 5.66 K/UL — SIGNIFICANT CHANGE UP (ref 3.8–10.5)
WBC # FLD AUTO: 5.66 K/UL — SIGNIFICANT CHANGE UP (ref 3.8–10.5)

## 2021-03-02 PROCEDURE — 80048 BASIC METABOLIC PNL TOTAL CA: CPT

## 2021-03-02 PROCEDURE — 85610 PROTHROMBIN TIME: CPT

## 2021-03-02 PROCEDURE — 83036 HEMOGLOBIN GLYCOSYLATED A1C: CPT

## 2021-03-02 PROCEDURE — 86900 BLOOD TYPING SEROLOGIC ABO: CPT

## 2021-03-02 PROCEDURE — 36415 COLL VENOUS BLD VENIPUNCTURE: CPT

## 2021-03-02 PROCEDURE — 81003 URINALYSIS AUTO W/O SCOPE: CPT

## 2021-03-02 PROCEDURE — 86850 RBC ANTIBODY SCREEN: CPT

## 2021-03-02 PROCEDURE — 71046 X-RAY EXAM CHEST 2 VIEWS: CPT

## 2021-03-02 PROCEDURE — 82375 ASSAY CARBOXYHB QUANT: CPT

## 2021-03-02 PROCEDURE — 71046 X-RAY EXAM CHEST 2 VIEWS: CPT | Mod: 26

## 2021-03-02 PROCEDURE — G0463: CPT | Mod: 25

## 2021-03-02 PROCEDURE — 86901 BLOOD TYPING SEROLOGIC RH(D): CPT

## 2021-03-02 PROCEDURE — 82040 ASSAY OF SERUM ALBUMIN: CPT

## 2021-03-02 PROCEDURE — 85025 COMPLETE CBC W/AUTO DIFF WBC: CPT

## 2021-03-02 PROCEDURE — 85730 THROMBOPLASTIN TIME PARTIAL: CPT

## 2021-03-02 NOTE — H&P PST ADULT - ASSESSMENT
30 year old woman with history of PCOS on metformin presents to PST for preprocedure exam. Patient is for planned laparoscopic sleeve gastrectomy with Dr Farnsworth on 3/10 .      Plan:  - PST instructions given ; NPO status instructions to be given by ASU .  - Pt instructed to take following meds with sip of water :   - Pt instructed to take routine evening medications unless indicated .  -  Stop NSAIDS ( Aspirin Alev Motrin Mobic Diclofenac), herbal supplements , MVI , Vitamin fish oil 7 days prior to surgery  unless   directed by surgeon or cardiologist;   - Medical Optimization  with Dr Singh.   - EZ wash instructions given.  - Labs EKG CXR as per surgeon request.   -  Pt instructed to self quarantine .  - Covid Testing scheduled. Pt notified and aware.      CAPRINI SCORE [CLOT]    AGE RELATED RISK FACTORS                                                       MOBILITY RELATED FACTORS  [ ] Age 41-60 years                                            (1 Point)                  [ ] Bed rest                                                        (1 Point)  [ ] Age: 61-74 years                                           (2 Points)                 [ ] Plaster cast                                                   (2 Points)  [ ] Age= 75 years                                              (3 Points)                 [ ] Bed bound for more than 72 hours                 (2 Points)    DISEASE RELATED RISK FACTORS                                               GENDER SPECIFIC FACTORS  [ ] Edema in the lower extremities                       (1 Point)                  [ ] Pregnancy                                                     (1 Point)  [ ] Varicose veins                                               (1 Point)                  [ ] Post-partum < 6 weeks                                   (1 Point)             [x ] BMI > 25 Kg/m2                                            (1 Point)                  [ ] Hormonal therapy  or oral contraception          (1 Point)                 [ ] Sepsis (in the previous month)                        (1 Point)                  [ ] History of pregnancy complications                 (1 point)  [ ] Pneumonia or serious lung disease                                               [ ] Unexplained or recurrent                     (1 Point)           (in the previous month)                               (1 Point)  [ ] Abnormal pulmonary function test                     (1 Point)                 SURGERY RELATED RISK FACTORS  [ ] Acute myocardial infarction                              (1 Point)                 [ ]  Section                                             (1 Point)  [ ] Congestive heart failure (in the previous month)  (1 Point)               [ ] Minor surgery                                                  (1 Point)   [ ] Inflammatory bowel disease                             (1 Point)                 [ ] Arthroscopic surgery                                        (2 Points)  [ ] Central venous access                                      (2 Points)                [x ] General surgery lasting more than 45 minutes   (2 Points)       [ ] Stroke (in the previous month)                          (5 Points)               [ ] Elective arthroplasty                                         (5 Points)     ()  malignancy                                                             (2 points )                                                                                                                                      HEMATOLOGY RELATED FACTORS                                                 TRAUMA RELATED RISK FACTORS  [ ] Prior episodes of VTE                                     (3 Points)                 [ ] Fracture of the hip, pelvis, or leg                       (5 Points)  [ ] Positive family history for VTE                         (3 Points)                 [ ] Acute spinal cord injury (in the previous month)  (5 Points)  [ ] Prothrombin 75813 A                                     (3 Points)                 [ ] Paralysis  (less than 1 month)                             (5 Points)  [ ] Factor V Leiden                                             (3 Points)                  [ ] Multiple Trauma within 1 month                        (5 Points)  [ ] Lupus anticoagulants                                     (3 Points)                                                           [ ] Anticardiolipin antibodies                               (3 Points)                                                       [ ] High homocysteine in the blood                      (3 Points)                                             [ ] Other congenital or acquired thrombophilia      (3 Points)                                                [ ] Heparin induced thrombocytopenia                  (3 Points)    =                              Total Score [   3       ]

## 2021-03-02 NOTE — H&P PST ADULT - NSICDXPASTMEDICALHX_GEN_ALL_CORE_FT
PAST MEDICAL HISTORY:  Hepatitis     Obesity     ERIC (obstructive sleep apnea) CPAP machine - just got the machine    Ovarian cyst     Polycystic ovarian disease on metformin

## 2021-03-02 NOTE — H&P PST ADULT - PT NEEDS ASSIST
"Patient states he received BCG vaccine in the past.  He had a chest xray in August.  He has sent his lab results to his \"family\" doctor who advised that he is up to date with his testing and no referral to Inf.Disease necessary at this time.    FYI to .    Марина Leon RN on 12/3/2019 at 9:49 AM        "
Left VM for patient to callback [facility] Triage  PERFECTO Uriostegui, Hyacinth Hi MD  [facility] Triage             Please call patient with the following  results below:   Hi Shrinkant,   I reviewed your labs,   Your screening test for TB [tuberculosis] was positive, which means you have been exposed to TB in past, I recommend you do a Chest Xray and I will refer you to Infectious disease specialist for further management/evaluation.   I will place CXR, please you will need to schedule and will put  referral to infectious disease specialist as well.   HIV test was non reactive [negative]   Your cholesterol panel look fine, HDL good cholesterol was 43, goal is  than 50,   LDL bad cholesterol is low at 92, and Triglyceride is normal at 104, total cholesterol is 156 normal   encourage exercise, weight loss as tolerated, low fat low cholesterol diet   Kidney electrolytes are normal, blood sugar is normal   Liver enzymes are normal and total protein and calcium are normal.   Test for diabetes HbA1c is normal at 5.4.   Any further question happy to address   Dr. Martinez        
Statement Selected
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  He states he is going to [place] and he will see his family doctor there then I asked him he said he has doctors in his family.  He stated that before he came to the [facility] he had a full body scan that was negative as per patient; no records available.  Patient describes that he had BCG vaccine back home.  Advised him that the blood tests for TB usually is not affected by the BCG but the skin PPD testing is.  I did advise him there is a slight possibility of false positive blood test results TB as well and the best option would be to do repeat QuantiFERON gold plus test he would consider, but reiterated he is going/ traveling to [place] sometime soon and he most likely will get tested there and he will report the results back to us.  I discussed with him extensively about what is testing for TB means, difference between latent and active TB difference  and course of treatment.  Patient was very appreciative of the phone call.  Patient will call us back to check if he wants to do the repeat QuantiFERON gold blood test here and order a chest x-ray but he wants to ask his insurance how much that would cost first.  He is concerned that his insurance may not cover this testing.  Dr Martinez  
no

## 2021-03-02 NOTE — H&P PST ADULT - NEGATIVE ENMT SYMPTOMS
No abnormalities No abnormalities No abnormalities No abnormalities No abnormalities No abnormalities No abnormalities No abnormalities No abnormalities No abnormalities No abnormalities no hearing difficulty/no ear pain/no tinnitus

## 2021-03-02 NOTE — H&P PST ADULT - HISTORY OF PRESENT ILLNESS
30 year old woman with history of PCOS on metformin presents to [email or web address] for preprocedure exam. Patient is for planned laparoscopic sleeve gastrectomy with Dr Farnsworth on 3/10 . Patient frustrated that she has only lost 4 lbs since starting liquid diet. No acute complaints.

## 2021-03-02 NOTE — H&P PST ADULT - NS PRO FEM REPRO PAP RESULTS
11/7/2023              Xavier Fagan        [address] [place] [id number]         To whom it may concern,    I saw Hunter Alcaraz in my office today. She has hand, foot, mouth and is cleared for school when fever free and doing better. Please feel free to call us with any questions.        Sincerely,            DO DARIAN [facility], [address], [place] [address]  [phone] normal

## 2021-03-02 NOTE — H&P PST ADULT - NS PRO LAST MENSTRUAL DATE
cannot recall when she had her last period- she gets spotting - she thinks it might be Novemebr 2020 when she had her period last

## 2021-03-03 ENCOUNTER — APPOINTMENT (OUTPATIENT)
Dept: PULMONOLOGY | Facility: CLINIC | Age: 31
End: 2021-03-03
Payer: MEDICAID

## 2021-03-03 VITALS
DIASTOLIC BLOOD PRESSURE: 86 MMHG | TEMPERATURE: 98 F | WEIGHT: 250 LBS | HEIGHT: 65 IN | OXYGEN SATURATION: 99 % | HEART RATE: 82 BPM | BODY MASS INDEX: 41.65 KG/M2 | SYSTOLIC BLOOD PRESSURE: 128 MMHG

## 2021-03-03 DIAGNOSIS — E66.01 MORBID (SEVERE) OBESITY DUE TO EXCESS CALORIES: ICD-10-CM

## 2021-03-03 PROBLEM — E28.2 POLYCYSTIC OVARIAN SYNDROME: Chronic | Status: ACTIVE | Noted: 2021-02-05

## 2021-03-03 PROBLEM — N83.209 UNSPECIFIED OVARIAN CYST, UNSPECIFIED SIDE: Chronic | Status: ACTIVE | Noted: 2021-03-02

## 2021-03-03 PROBLEM — G47.33 OBSTRUCTIVE SLEEP APNEA (ADULT) (PEDIATRIC): Chronic | Status: ACTIVE | Noted: 2021-02-05

## 2021-03-03 PROCEDURE — 99072 ADDL SUPL MATRL&STAF TM PHE: CPT

## 2021-03-03 PROCEDURE — 99215 OFFICE O/P EST HI 40 MIN: CPT

## 2021-03-03 NOTE — REVIEW OF SYSTEMS
[EDS: ESS=____] : daytime somnolence: ESS=[unfilled] [A.M. Dry Mouth] : a.m. dry mouth [Obesity] : obesity [Difficulty Maintaining Sleep] : difficulty maintaining sleep [Negative] : Genitourinary [Snoring] : no snoring [Witnessed Apneas] : no witnessed apnea [Difficulty Initiating Sleep] : no difficulty falling asleep [Lower Extremity Discomfort] : no lower extremity discomfort [Irresistible urge to move legs] : no irresistible urge to move legs because of lower extremity discomfort [Late day/ Evening symptoms] : no late day/evening symptoms [Sleep Disturbances due to LE symptoms] : ~T no sleep disturbances due to lower extremity symptoms [Unusual Sleep Behavior] : no unusual sleep behavior [Hypersomnolence] : not sleeping much more than usual [Cataplexy] :  no cataplexy [Hypnogogic Hallucinations] : no hypnogogic hallucinations [Hypnopompic Hallucinations] : no hypnopompic hallucinations [Sleep Paralysis] : no sleep paralysis

## 2021-03-03 NOTE — HISTORY OF PRESENT ILLNESS
[Obstructive Sleep Apnea] : obstructive sleep apnea [ESS: ___] : ESS score [unfilled] [Date: ___] : Date of most recent diagnostic polysomnogram: [unfilled] [AHI: ___ per hour] : Apnea-hypopnea index:  [unfilled] per hour [T90%: ___] : T90%: [unfilled]% [FreeTextEntry1] : Ms. James is a 30 year old female with severe ERIC here for a follow up. She requires pulmonary optimization in order to proceed to surgery. She recently received her APAP machine. She is having difficulty using the machine at night. States she feels it is too much air and too cold. Otherwise there is no significant change in her medical history at this time. \par \par Fax: [phone]\par Dr. Yury Sanchez\par \par ______________________________________________________________________________________ \par EPWORTH SLEEPINESS SCALE \par How likely are you to doze off or fall asleep in the situations described below, in contrast to feeling just tired? \par This refers to your usual way of life in recent times. \par Even if you haven't done some of these things recently, try to work out how they would have affected you. \par Use the following scale to choose one most appropriate number for each situation. \par \par 0 = Never would doze \par 1 = Slight chance of dozing \par 2 = Moderate chance of dozing \par 3 = High chance of dozing \par \par  \par 1........................................Sitting and reading \par 1........................................Watching TV \par 1........................................Sitting inactive in a public place (eg a theatre or a meeting) \par 0........................................As a passenger in a car for an hour without a break \par 3........................................Lying down to rest in the afternoon when circumstances permit \par 0........................................Sitting and talking to someone \par 0........................................Sitting quietly after lunch without alcohol \par 0........................................In a car, while stopped for a few minutes in traffic \par 6........................................TOTAL SCORE \par  [Snoring] : no snoring [Witnessed Apneas] : no witnessed sleep apnea [Frequent Nocturnal Awakening] : no nocturnal awakening

## 2021-03-03 NOTE — PHYSICAL EXAM
[Enlarged Base of the Tongue] : enlargement of the base of the tongue [IV] : IV [Neck Appearance] : the appearance of the neck was normal [Neck Cervical Mass (___cm)] : no neck mass was observed [Apical Impulse] : the apical impulse was normal [Heart Rate And Rhythm] : heart rate was normal and rhythm regular [Heart Sounds] : normal S1 and S2 [] : no respiratory distress [Respiration, Rhythm And Depth] : normal respiratory rhythm and effort [Auscultation Breath Sounds / Voice Sounds] : lungs were clear to auscultation bilaterally [Nail Clubbing] : no clubbing of the fingernails [Cyanosis, Localized] : no localized cyanosis [Petechial Hemorrhages (___cm)] : no petechial hemorrhages [Oriented To Time, Place, And Person] : oriented to person, place, and time [Impaired Insight] : insight and judgment were intact [Affect] : the affect was normal [FreeTextEntry2] : no edema

## 2021-03-03 NOTE — ASSESSMENT
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 Therapeutic and compliance data download reveals usage 5 total days with an average use of 2 hours and 41 minutes. Therapy based AHI is 9.4/hr on 5-20 cm H2O. She likely will need continued use of her APAP machine through her surgery and afterwards. We spent time speaking about how to use her humidification, heat, and ramp time on her APAP and she was referred to Youtube for videos on how to better control these settings. \par \par We discuss at length that given her anatomy of her facial features, she will likely have residual sleep apnea that will not be resolved by her bariatric surgery. She may require straight CPAP for comfort or BIPAP. \par \par For now she is encouraged to continue using her device. She will follow up with me in 2 months better management of her APAP machine after her surgery. \par \par A letter will be sent to her surgeon today.\par

## 2021-03-04 ENCOUNTER — APPOINTMENT (OUTPATIENT)
Dept: INTERNAL MEDICINE | Facility: CLINIC | Age: 31
End: 2021-03-04
Payer: MEDICAID

## 2021-03-04 VITALS
BODY MASS INDEX: 41.65 KG/M2 | SYSTOLIC BLOOD PRESSURE: 112 MMHG | DIASTOLIC BLOOD PRESSURE: 73 MMHG | RESPIRATION RATE: 16 BRPM | OXYGEN SATURATION: 100 % | WEIGHT: 250 LBS | HEIGHT: 65 IN | TEMPERATURE: 98.5 F | HEART RATE: 74 BPM

## 2021-03-04 DIAGNOSIS — E66.9 OBESITY, UNSPECIFIED: ICD-10-CM

## 2021-03-04 DIAGNOSIS — E28.2 POLYCYSTIC OVARIAN SYNDROME: ICD-10-CM

## 2021-03-04 PROCEDURE — 99072 ADDL SUPL MATRL&STAF TM PHE: CPT

## 2021-03-04 PROCEDURE — 99214 OFFICE O/P EST MOD 30 MIN: CPT

## 2021-03-04 NOTE — HISTORY OF PRESENT ILLNESS
[No Pertinent Cardiac History] : no history of aortic stenosis, atrial fibrillation, coronary artery disease, recent myocardial infarction, or implantable device/pacemaker [Sleep Apnea] : sleep apnea [No Adverse Anesthesia Reaction] : no adverse anesthesia reaction in self or family member [(Patient denies any chest pain, claudication, dyspnea on exertion, orthopnea, palpitations or syncope)] : Patient denies any chest pain, claudication, dyspnea on exertion, orthopnea, palpitations or syncope [Moderate (4-6 METs)] : Moderate (4-6 METs) [Asthma] : no asthma [COPD] : no COPD [Smoker] : not a smoker [Chronic Anticoagulation] : no chronic anticoagulation [Chronic Kidney Disease] : no chronic kidney disease [Diabetes] : no diabetes [FreeTextEntry1] : bariatric sleeve [FreeTextEntry2] : 3/10/21 [FreeTextEntry3] : Rex [FreeTextEntry4] : Pt has had cardio and pulm clearance\par not taking metformin

## 2021-03-07 ENCOUNTER — APPOINTMENT (OUTPATIENT)
Dept: DISASTER EMERGENCY | Facility: CLINIC | Age: 31
End: 2021-03-07

## 2021-03-07 LAB — SARS-COV-2 N GENE NPH QL NAA+PROBE: NOT DETECTED

## 2021-03-10 ENCOUNTER — RESULT REVIEW (OUTPATIENT)
Age: 31
End: 2021-03-10

## 2021-03-10 ENCOUNTER — INPATIENT (INPATIENT)
Facility: HOSPITAL | Age: 31
LOS: 1 days | Discharge: HOME | DRG: 403 | End: 2021-03-12
Attending: SURGERY | Admitting: SURGERY
Payer: MEDICAID

## 2021-03-10 VITALS
TEMPERATURE: 97 F | HEART RATE: 72 BPM | HEIGHT: 65 IN | WEIGHT: 250 LBS | SYSTOLIC BLOOD PRESSURE: 126 MMHG | DIASTOLIC BLOOD PRESSURE: 76 MMHG | OXYGEN SATURATION: 100 % | RESPIRATION RATE: 16 BRPM

## 2021-03-10 DIAGNOSIS — E66.01 MORBID (SEVERE) OBESITY DUE TO EXCESS CALORIES: ICD-10-CM

## 2021-03-10 DIAGNOSIS — N83.209 UNSPECIFIED OVARIAN CYST, UNSPECIFIED SIDE: Chronic | ICD-10-CM

## 2021-03-10 LAB — HCG UR QL: NEGATIVE — SIGNIFICANT CHANGE UP

## 2021-03-10 PROCEDURE — 88307 TISSUE EXAM BY PATHOLOGIST: CPT | Mod: 26

## 2021-03-10 PROCEDURE — 36415 COLL VENOUS BLD VENIPUNCTURE: CPT

## 2021-03-10 PROCEDURE — C9399: CPT

## 2021-03-10 PROCEDURE — C9113: CPT

## 2021-03-10 PROCEDURE — 82962 GLUCOSE BLOOD TEST: CPT

## 2021-03-10 PROCEDURE — 85025 COMPLETE CBC W/AUTO DIFF WBC: CPT

## 2021-03-10 PROCEDURE — 81025 URINE PREGNANCY TEST: CPT

## 2021-03-10 PROCEDURE — 80048 BASIC METABOLIC PNL TOTAL CA: CPT

## 2021-03-10 PROCEDURE — 43775 LAP SLEEVE GASTRECTOMY: CPT | Mod: AS

## 2021-03-10 PROCEDURE — C1889: CPT

## 2021-03-10 PROCEDURE — 88307 TISSUE EXAM BY PATHOLOGIST: CPT

## 2021-03-10 RX ORDER — INFLUENZA VIRUS VACCINE 15; 15; 15; 15 UG/.5ML; UG/.5ML; UG/.5ML; UG/.5ML
0.5 SUSPENSION INTRAMUSCULAR ONCE
Refills: 0 | Status: COMPLETED | OUTPATIENT
Start: 2021-03-10 | End: 2021-03-10

## 2021-03-10 RX ORDER — HEPARIN SODIUM 5000 [USP'U]/ML
5000 INJECTION INTRAVENOUS; SUBCUTANEOUS ONCE
Refills: 0 | Status: COMPLETED | OUTPATIENT
Start: 2021-03-10 | End: 2021-03-10

## 2021-03-10 RX ORDER — PANTOPRAZOLE SODIUM 20 MG/1
40 TABLET, DELAYED RELEASE ORAL EVERY 24 HOURS
Refills: 0 | Status: DISCONTINUED | OUTPATIENT
Start: 2021-03-10 | End: 2021-03-12

## 2021-03-10 RX ORDER — HYDROMORPHONE HYDROCHLORIDE 2 MG/ML
0.5 INJECTION INTRAMUSCULAR; INTRAVENOUS; SUBCUTANEOUS
Refills: 0 | Status: DISCONTINUED | OUTPATIENT
Start: 2021-03-10 | End: 2021-03-10

## 2021-03-10 RX ORDER — SODIUM CHLORIDE 9 MG/ML
1000 INJECTION, SOLUTION INTRAVENOUS
Refills: 0 | Status: DISCONTINUED | OUTPATIENT
Start: 2021-03-10 | End: 2021-03-12

## 2021-03-10 RX ORDER — OXYCODONE HYDROCHLORIDE 5 MG/1
10 TABLET ORAL EVERY 4 HOURS
Refills: 0 | Status: DISCONTINUED | OUTPATIENT
Start: 2021-03-10 | End: 2021-03-12

## 2021-03-10 RX ORDER — ONDANSETRON 8 MG/1
4 TABLET, FILM COATED ORAL ONCE
Refills: 0 | Status: DISCONTINUED | OUTPATIENT
Start: 2021-03-10 | End: 2021-03-10

## 2021-03-10 RX ORDER — GLUCAGON INJECTION, SOLUTION 0.5 MG/.1ML
1 INJECTION, SOLUTION SUBCUTANEOUS ONCE
Refills: 0 | Status: DISCONTINUED | OUTPATIENT
Start: 2021-03-10 | End: 2021-03-12

## 2021-03-10 RX ORDER — DEXTROSE 50 % IN WATER 50 %
25 SYRINGE (ML) INTRAVENOUS ONCE
Refills: 0 | Status: DISCONTINUED | OUTPATIENT
Start: 2021-03-10 | End: 2021-03-12

## 2021-03-10 RX ORDER — DEXTROSE 50 % IN WATER 50 %
12.5 SYRINGE (ML) INTRAVENOUS ONCE
Refills: 0 | Status: DISCONTINUED | OUTPATIENT
Start: 2021-03-10 | End: 2021-03-12

## 2021-03-10 RX ORDER — ONDANSETRON 8 MG/1
4 TABLET, FILM COATED ORAL EVERY 6 HOURS
Refills: 0 | Status: DISCONTINUED | OUTPATIENT
Start: 2021-03-10 | End: 2021-03-12

## 2021-03-10 RX ORDER — DEXTROSE 50 % IN WATER 50 %
15 SYRINGE (ML) INTRAVENOUS ONCE
Refills: 0 | Status: DISCONTINUED | OUTPATIENT
Start: 2021-03-10 | End: 2021-03-12

## 2021-03-10 RX ORDER — ACETAMINOPHEN 500 MG
1000 TABLET ORAL ONCE
Refills: 0 | Status: COMPLETED | OUTPATIENT
Start: 2021-03-10 | End: 2021-03-10

## 2021-03-10 RX ORDER — SODIUM CHLORIDE 9 MG/ML
1000 INJECTION, SOLUTION INTRAVENOUS
Refills: 0 | Status: DISCONTINUED | OUTPATIENT
Start: 2021-03-10 | End: 2021-03-10

## 2021-03-10 RX ORDER — ACETAMINOPHEN 500 MG
1000 TABLET ORAL ONCE
Refills: 0 | Status: DISCONTINUED | OUTPATIENT
Start: 2021-03-10 | End: 2021-03-12

## 2021-03-10 RX ORDER — OXYCODONE HYDROCHLORIDE 5 MG/1
5 TABLET ORAL EVERY 4 HOURS
Refills: 0 | Status: DISCONTINUED | OUTPATIENT
Start: 2021-03-10 | End: 2021-03-12

## 2021-03-10 RX ORDER — ENOXAPARIN SODIUM 100 MG/ML
40 INJECTION SUBCUTANEOUS EVERY 12 HOURS
Refills: 0 | Status: DISCONTINUED | OUTPATIENT
Start: 2021-03-10 | End: 2021-03-12

## 2021-03-10 RX ORDER — KETOROLAC TROMETHAMINE 30 MG/ML
30 SYRINGE (ML) INJECTION EVERY 6 HOURS
Refills: 0 | Status: DISCONTINUED | OUTPATIENT
Start: 2021-03-10 | End: 2021-03-12

## 2021-03-10 RX ORDER — APREPITANT 80 MG/1
80 CAPSULE ORAL ONCE
Refills: 0 | Status: COMPLETED | OUTPATIENT
Start: 2021-03-10 | End: 2021-03-10

## 2021-03-10 RX ORDER — INSULIN LISPRO 100/ML
VIAL (ML) SUBCUTANEOUS EVERY 6 HOURS
Refills: 0 | Status: DISCONTINUED | OUTPATIENT
Start: 2021-03-10 | End: 2021-03-12

## 2021-03-10 RX ADMIN — HYDROMORPHONE HYDROCHLORIDE 0.5 MILLIGRAM(S): 2 INJECTION INTRAMUSCULAR; INTRAVENOUS; SUBCUTANEOUS at 10:03

## 2021-03-10 RX ADMIN — Medication 30 MILLIGRAM(S): at 17:08

## 2021-03-10 RX ADMIN — HYDROMORPHONE HYDROCHLORIDE 0.5 MILLIGRAM(S): 2 INJECTION INTRAMUSCULAR; INTRAVENOUS; SUBCUTANEOUS at 09:57

## 2021-03-10 RX ADMIN — Medication 30 MILLIGRAM(S): at 23:38

## 2021-03-10 RX ADMIN — ONDANSETRON 4 MILLIGRAM(S): 8 TABLET, FILM COATED ORAL at 17:17

## 2021-03-10 RX ADMIN — Medication 1000 MILLIGRAM(S): at 23:36

## 2021-03-10 RX ADMIN — Medication 400 MILLIGRAM(S): at 23:06

## 2021-03-10 RX ADMIN — SODIUM CHLORIDE 75 MILLILITER(S): 9 INJECTION, SOLUTION INTRAVENOUS at 09:58

## 2021-03-10 RX ADMIN — Medication 1000 MILLIGRAM(S): at 17:11

## 2021-03-10 RX ADMIN — OXYCODONE HYDROCHLORIDE 10 MILLIGRAM(S): 5 TABLET ORAL at 11:01

## 2021-03-10 RX ADMIN — HYDROMORPHONE HYDROCHLORIDE 0.5 MILLIGRAM(S): 2 INJECTION INTRAMUSCULAR; INTRAVENOUS; SUBCUTANEOUS at 10:18

## 2021-03-10 RX ADMIN — HYDROMORPHONE HYDROCHLORIDE 0.5 MILLIGRAM(S): 2 INJECTION INTRAMUSCULAR; INTRAVENOUS; SUBCUTANEOUS at 10:17

## 2021-03-10 RX ADMIN — Medication 30 MILLIGRAM(S): at 23:08

## 2021-03-10 RX ADMIN — HYDROMORPHONE HYDROCHLORIDE 0.5 MILLIGRAM(S): 2 INJECTION INTRAMUSCULAR; INTRAVENOUS; SUBCUTANEOUS at 10:08

## 2021-03-10 RX ADMIN — HYDROMORPHONE HYDROCHLORIDE 0.5 MILLIGRAM(S): 2 INJECTION INTRAMUSCULAR; INTRAVENOUS; SUBCUTANEOUS at 10:31

## 2021-03-10 RX ADMIN — HEPARIN SODIUM 5000 UNIT(S): 5000 INJECTION INTRAVENOUS; SUBCUTANEOUS at 07:00

## 2021-03-10 RX ADMIN — OXYCODONE HYDROCHLORIDE 10 MILLIGRAM(S): 5 TABLET ORAL at 10:54

## 2021-03-10 RX ADMIN — Medication 400 MILLIGRAM(S): at 17:08

## 2021-03-10 RX ADMIN — HYDROMORPHONE HYDROCHLORIDE 0.5 MILLIGRAM(S): 2 INJECTION INTRAMUSCULAR; INTRAVENOUS; SUBCUTANEOUS at 10:28

## 2021-03-10 RX ADMIN — APREPITANT 80 MILLIGRAM(S): 80 CAPSULE ORAL at 07:00

## 2021-03-10 RX ADMIN — PANTOPRAZOLE SODIUM 40 MILLIGRAM(S): 20 TABLET, DELAYED RELEASE ORAL at 09:57

## 2021-03-10 RX ADMIN — SODIUM CHLORIDE 75 MILLILITER(S): 9 INJECTION, SOLUTION INTRAVENOUS at 11:04

## 2021-03-10 RX ADMIN — Medication 30 MILLIGRAM(S): at 17:11

## 2021-03-10 RX ADMIN — ENOXAPARIN SODIUM 40 MILLIGRAM(S): 100 INJECTION SUBCUTANEOUS at 21:45

## 2021-03-10 NOTE — CHART NOTE - NSCHARTNOTEFT_GEN_A_CORE
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     Post-operative Diagnosis: Same       Primary Procedure    [05081] Lap Longitudinal Gastrectomy        Secondary Procedure(s)    [37350] Uppr Gi Endoscopy, Diagnosis   [06740] Transversus Abdominis Plan (TAP) Block Bilateral        Surgeon(s)    Surgeon: Yury Sanchez M.D.   Assistant surgeon: Zoey Celaya RPA      Surgery Date/Times     Surgery Date: March 10, 2021     Admission Date: March 10, 2021        Anesthesia    Anesthesia type: General Anesthesia      Specimens      Other Specimens: partial gastrectomy sent to pathology     Fluids     Fluids Out (ml)   Estimated blood loss: 30 ml                 Notes  DRAINS: NONE     COMPLICATIONS: NONE      INDICATIONS OF THE PROCEDURE: The patient is a 30-year-old female who is morbidly obese with a body mass index of 44. The patient has failed multiple nonoperative attempts at weight loss. The patient meets NIH criteria for bariatric surgery and underwent appropriate preoperative workup, education, and signed the consent form freely. The patient had risks and benefits explained including, but not limited to, bleeding, leak, infection, disability, injury to transient structures, aspiration, DVT, pulmonary embolism, and death. The patient understood and agreed to proceed with surgery.      DESCRIPTION OF PROCEDURE: The patient was identified properly via a time-out. The patient was brought into the operating room and was placed on the operating room table in supine position. The patient was then given general endotracheal anesthesia and was given preoperative antibiotics. Preoperative heparin was given in the ambulatory surgery unit. The patient was then prepped and draped in the usual sterile fashion. An Exparel mixture was mixed at the back table with 20 mL of Exparel, 30 mL of 0.25% Marcaine and 150 mL of normal saline. A Veress needle was placed in the left upper quadrant. The abdomen was insufflated to 15 millimeters of mercury. Once the abdomen was insufflated, the Exparel mixture was given subcutaneously at the sites of incision. An incision was made within the umbilicus and a 12-millimeter trocar was placed in the abdomen. The laparoscope was inserted and there was no injury on initial trocar placement or initial Veress needle placement. A Transversus Abdominus Plane Block was then conducted by placing 20 mL of the Exparel mixture preperitoneal at the distribution of the spinal nerves on the lateral abdominal wall. This was started at the costal margin at the mid axillary line. 20cc of the Exparel mixture was placed in this area. Another 20 mL was placed four centimeters caudal to this area. Another 20 mL was placed four centimeters caudal to this area and another 15 mL was placed four centimeters caudal to this area. This was repeated on the opposite side of the body in a similar fashion. The rest of the Exparel was placed into the subcutaneous tissues and preperitoneal at every trocar site. A 5-millimeter and 15-millimeter trocar was placed in the right upper quadrant. A 12-millimeter and 5-millimeter trocar was placed in left upper quadrant. An incision was made in subxiphoid area and a liver retractor was placed to hold the liver anteriorly and superiorly and was held in place using Mediflex device. The patient was then placed into steep reverse Trendelenburg position and a point along the stomach was then measured approximately 5 centimeters proximal to the pylorus and the greater curvature of the stomach was taken down from that point. The greater curvature was taken down all the way to the angle of His and the stomach was removed from the left crura using the Ligasure device. At this point, the stomach was completely mobilized. A 40-English bougie was then placed into the stomach and placed into the antrum. At this point a 60 millimeter iDrive stapler with a tri-staple black load was used to start transecting the stomach along the bougie, approximately 5 more firings using the purple load were used to staple the stomach to wrap around the bougie to make a nice sleeve around the bougie. Once the stomach was completely transected, the stomach was placed into an EndoCatch bag and removed from the abdomen. A Vicryl suture was used to oversew the staple line in a Lembert fashion from the top of the suture line to about jail down the sleeve gastrectomy in a continuous running fashion. Two sutures, 3-0 sutures were placed in the antrum of the stomach to the omental fat so that the sleeve would not rotate. Hemostasis was achieved, the remaining stomach was placed under saline solution and an endoscopy was then conducted. There was no leak from the staple line. There was no bleeding or stricture at the staple line. Once the endoscopy was completed, complete hemostasis was assured. The 15-millimeter trocar was then removed and the fascia was closed with a 0 Vicryl suture using the suture passer. Upon completion of the procedure, the abdomen was desufflated and all trocars were removed. The skin was closed with 4-0 Monocryl running subcuticular sutures. The patient tolerated the procedure well.     Disposition      To recovery room, VS stable.         PHYSICIAN SIGNATURE: ___________________________ DATE:____/____/______                                                   Yury Sanchze M.D.       This document was electronically signed by Yury Sanchez on Mar 10, 2021 2:42 PM Local Standard Time.

## 2021-03-10 NOTE — BRIEF OPERATIVE NOTE - NSICDXBRIEFPROCEDURE_GEN_ALL_CORE_FT
PROCEDURES:  Block, transversus abdominis plane, bilateral 10-Mar-2021 09:21:30  Zoey Gomez  EGD 10-Mar-2021 09:21:27  Zoey Gomez  Laparoscopic gastrectomy 10-Mar-2021 09:21:24  Zoey Gomez

## 2021-03-11 ENCOUNTER — TRANSCRIPTION ENCOUNTER (OUTPATIENT)
Age: 31
End: 2021-03-11

## 2021-03-11 ENCOUNTER — APPOINTMENT (OUTPATIENT)
Dept: HEPATOLOGY | Facility: CLINIC | Age: 31
End: 2021-03-11

## 2021-03-11 DIAGNOSIS — E66.9 OBESITY, UNSPECIFIED: ICD-10-CM

## 2021-03-11 LAB
ANION GAP SERPL CALC-SCNC: 6 MMOL/L — SIGNIFICANT CHANGE UP (ref 5–17)
BASOPHILS # BLD AUTO: 0.01 K/UL — SIGNIFICANT CHANGE UP (ref 0–0.2)
BASOPHILS NFR BLD AUTO: 0.1 % — SIGNIFICANT CHANGE UP (ref 0–2)
BUN SERPL-MCNC: 5 MG/DL — LOW (ref 7–23)
CALCIUM SERPL-MCNC: 8.8 MG/DL — SIGNIFICANT CHANGE UP (ref 8.5–10.1)
CHLORIDE SERPL-SCNC: 110 MMOL/L — HIGH (ref 96–108)
CO2 SERPL-SCNC: 23 MMOL/L — SIGNIFICANT CHANGE UP (ref 22–31)
CREAT SERPL-MCNC: 0.47 MG/DL — LOW (ref 0.5–1.3)
EOSINOPHIL # BLD AUTO: 0 K/UL — SIGNIFICANT CHANGE UP (ref 0–0.5)
EOSINOPHIL NFR BLD AUTO: 0 % — SIGNIFICANT CHANGE UP (ref 0–6)
GLUCOSE SERPL-MCNC: 81 MG/DL — SIGNIFICANT CHANGE UP (ref 70–99)
HCT VFR BLD CALC: 38.5 % — SIGNIFICANT CHANGE UP (ref 34.5–45)
HGB BLD-MCNC: 12.7 G/DL — SIGNIFICANT CHANGE UP (ref 11.5–15.5)
IMM GRANULOCYTES NFR BLD AUTO: 0.5 % — SIGNIFICANT CHANGE UP (ref 0–1.5)
LYMPHOCYTES # BLD AUTO: 1.08 K/UL — SIGNIFICANT CHANGE UP (ref 1–3.3)
LYMPHOCYTES # BLD AUTO: 12.6 % — LOW (ref 13–44)
MCHC RBC-ENTMCNC: 28.9 PG — SIGNIFICANT CHANGE UP (ref 27–34)
MCHC RBC-ENTMCNC: 33 GM/DL — SIGNIFICANT CHANGE UP (ref 32–36)
MCV RBC AUTO: 87.5 FL — SIGNIFICANT CHANGE UP (ref 80–100)
MONOCYTES # BLD AUTO: 0.79 K/UL — SIGNIFICANT CHANGE UP (ref 0–0.9)
MONOCYTES NFR BLD AUTO: 9.2 % — SIGNIFICANT CHANGE UP (ref 2–14)
NEUTROPHILS # BLD AUTO: 6.66 K/UL — SIGNIFICANT CHANGE UP (ref 1.8–7.4)
NEUTROPHILS NFR BLD AUTO: 77.6 % — HIGH (ref 43–77)
PLATELET # BLD AUTO: 168 K/UL — SIGNIFICANT CHANGE UP (ref 150–400)
POTASSIUM SERPL-MCNC: 3.6 MMOL/L — SIGNIFICANT CHANGE UP (ref 3.5–5.3)
POTASSIUM SERPL-SCNC: 3.6 MMOL/L — SIGNIFICANT CHANGE UP (ref 3.5–5.3)
RBC # BLD: 4.4 M/UL — SIGNIFICANT CHANGE UP (ref 3.8–5.2)
RBC # FLD: 12.2 % — SIGNIFICANT CHANGE UP (ref 10.3–14.5)
SODIUM SERPL-SCNC: 139 MMOL/L — SIGNIFICANT CHANGE UP (ref 135–145)
WBC # BLD: 8.58 K/UL — SIGNIFICANT CHANGE UP (ref 3.8–10.5)
WBC # FLD AUTO: 8.58 K/UL — SIGNIFICANT CHANGE UP (ref 3.8–10.5)

## 2021-03-11 RX ORDER — METFORMIN HYDROCHLORIDE 850 MG/1
1 TABLET ORAL
Qty: 0 | Refills: 0 | DISCHARGE

## 2021-03-11 RX ORDER — CHOLECALCIFEROL (VITAMIN D3) 125 MCG
0 CAPSULE ORAL
Qty: 0 | Refills: 0 | DISCHARGE

## 2021-03-11 RX ORDER — ACETAMINOPHEN 500 MG
1000 TABLET ORAL ONCE
Refills: 0 | Status: COMPLETED | OUTPATIENT
Start: 2021-03-11 | End: 2021-03-11

## 2021-03-11 RX ADMIN — Medication 30 MILLIGRAM(S): at 13:01

## 2021-03-11 RX ADMIN — ENOXAPARIN SODIUM 40 MILLIGRAM(S): 100 INJECTION SUBCUTANEOUS at 13:00

## 2021-03-11 RX ADMIN — Medication 1000 MILLIGRAM(S): at 05:46

## 2021-03-11 RX ADMIN — Medication 30 MILLIGRAM(S): at 05:46

## 2021-03-11 RX ADMIN — PANTOPRAZOLE SODIUM 40 MILLIGRAM(S): 20 TABLET, DELAYED RELEASE ORAL at 13:00

## 2021-03-11 RX ADMIN — Medication 400 MILLIGRAM(S): at 05:16

## 2021-03-11 RX ADMIN — ENOXAPARIN SODIUM 40 MILLIGRAM(S): 100 INJECTION SUBCUTANEOUS at 22:39

## 2021-03-11 RX ADMIN — Medication 30 MILLIGRAM(S): at 05:16

## 2021-03-11 NOTE — PROGRESS NOTE ADULT - PROBLEM SELECTOR PLAN 1
The patient is s/p lap sleeve gastrectomy and doing very well.  All discharge instructions were given to the patient, as well as potential signs of complications.  The patient will follow up in 2 weeks.  DC Home today vs. tomorrow

## 2021-03-11 NOTE — DISCHARGE NOTE PROVIDER - HOSPITAL COURSE
Patient is an 29 yo F that underwent laparoscopic sleeve gastrectomy without any complications. Patient is currently doing very well, pain controlled and is tolerating phase I bariatric diet. Patient has had uncomplicated hospital course and is stable for discharge home with follow-up in the off ice 2 weeks.

## 2021-03-11 NOTE — DISCHARGE NOTE PROVIDER - INSTRUCTIONS
Detailed msg left to VM. Advised to call back to f/u with regards to lasix.   Phase I bariatric diet

## 2021-03-11 NOTE — PROGRESS NOTE ADULT - SUBJECTIVE AND OBJECTIVE BOX
Post Op Day#: 1  Procedure:  Laparoscopic Sleeve Gastrectomy    The patient is doing well without complaints.    Vital Signs Last 24 Hrs  T(C): 36.8 (11 Mar 2021 08:51), Max: 36.8 (10 Mar 2021 09:32)  T(F): 98.2 (11 Mar 2021 08:51), Max: 98.2 (10 Mar 2021 09:32)  HR: 71 (11 Mar 2021 08:51) (64 - 82)  BP: 126/78 (11 Mar 2021 08:51) (100/65 - 129/70)  BP(mean): --  RR: 18 (11 Mar 2021 08:51) (16 - 22)  SpO2: 99% (11 Mar 2021 08:51) (99% - 100%)    PHYSICAL EXAM:  General: NAD.  HEENT: no JVD, no jaundice.  LUNGS: CTAB.  Heart: S1 S2 RRR  Abd: soft nt/nd   Wounds: clean dry and intact                          12.7   8.58  )-----------( 168      ( 11 Mar 2021 07:01 )             38.5       03-11    139  |  110<H>  |  5<L>  ----------------------------<  81  3.6   |  23  |  0.47<L>    Ca    8.8      11 Mar 2021 07:01

## 2021-03-11 NOTE — DISCHARGE NOTE PROVIDER - CARE PROVIDER_API CALL
Yury Sanchez)  Surgery  [address], [address]  [place] [address]  Phone: [phone]  Fax: [phone]  Follow Up Time:

## 2021-03-11 NOTE — DISCHARGE NOTE PROVIDER - NSDCFUSCHEDAPPT_GEN_ALL_CORE_FT
NIKOLAS GOLDMAN ; 04/22/2021 ; NPP Endocrin [address] ; 05/06/2021 ; NPP Med Pulm [address]

## 2021-03-12 ENCOUNTER — TRANSCRIPTION ENCOUNTER (OUTPATIENT)
Age: 31
End: 2021-03-12

## 2021-03-12 VITALS
RESPIRATION RATE: 16 BRPM | TEMPERATURE: 99 F | OXYGEN SATURATION: 99 % | SYSTOLIC BLOOD PRESSURE: 119 MMHG | HEART RATE: 68 BPM | DIASTOLIC BLOOD PRESSURE: 73 MMHG

## 2021-03-12 DIAGNOSIS — E66.01 MORBID (SEVERE) OBESITY DUE TO EXCESS CALORIES: ICD-10-CM

## 2021-03-12 RX ADMIN — PANTOPRAZOLE SODIUM 40 MILLIGRAM(S): 20 TABLET, DELAYED RELEASE ORAL at 08:45

## 2021-03-12 RX ADMIN — Medication 30 MILLIGRAM(S): at 08:45

## 2021-03-12 RX ADMIN — ENOXAPARIN SODIUM 40 MILLIGRAM(S): 100 INJECTION SUBCUTANEOUS at 08:44

## 2021-03-12 NOTE — PROGRESS NOTE ADULT - SUBJECTIVE AND OBJECTIVE BOX
Post Op Day#: 2  Procedure:  Laparoscopic Sleeve Gastrectomy    The patient is doing well without complaints.    Vital Signs Last 24 Hrs  T(C): 37.1 (12 Mar 2021 08:46), Max: 37.1 (12 Mar 2021 00:12)  T(F): 98.7 (12 Mar 2021 08:46), Max: 98.7 (12 Mar 2021 00:12)  HR: 68 (12 Mar 2021 08:46) (60 - 68)  BP: 119/73 (12 Mar 2021 08:46) (114/67 - 119/73)  BP(mean): --  RR: 16 (12 Mar 2021 08:46) (16 - 18)  SpO2: 99% (12 Mar 2021 08:46) (99% - 100%)    PHYSICAL EXAM:  General: NAD.  HEENT: no JVD, no jaundice.  LUNGS: CTAB.  Heart: S1 S2 RRR  Abd: soft nt/nd   Wounds: clean dry and intact                          12.7   8.58  )-----------( 168      ( 11 Mar 2021 07:01 )             38.5       03-11    139  |  110<H>  |  5<L>  ----------------------------<  81  3.6   |  23  |  0.47<L>    Ca    8.8      11 Mar 2021 07:01

## 2021-03-12 NOTE — DISCHARGE NOTE NURSING/CASE MANAGEMENT/SOCIAL WORK - PATIENT PORTAL LINK FT
You can access the FollowMyHealth Patient Portal offered by [facility] by registering at the following website: [email or web address]. By joining [facility]’s FollowMyHealth portal, you will also be able to view your health information using other applications (apps) compatible with our system.

## 2021-03-12 NOTE — PROGRESS NOTE ADULT - ASSESSMENT
Patient is an 29 yo F s/p  Laparoscopic Sleeve Gastrectomy
Patient is an 31 yo F s/p  Laparoscopic Sleeve Gastrectomy

## 2021-03-12 NOTE — PROGRESS NOTE ADULT - PROBLEM SELECTOR PLAN 1
The patient is s/p lap sleeve gastrectomy and doing very well.  All discharge instructions were given to the patient, as well as potential signs of complications.  The patient will follow up in 2 weeks.  [facility]

## 2021-03-18 DIAGNOSIS — E66.01 MORBID (SEVERE) OBESITY DUE TO EXCESS CALORIES: ICD-10-CM

## 2021-05-06 ENCOUNTER — APPOINTMENT (OUTPATIENT)
Dept: PULMONOLOGY | Facility: CLINIC | Age: 31
End: 2021-05-06
Payer: MEDICAID

## 2021-05-06 VITALS
HEIGHT: 65 IN | WEIGHT: 217 LBS | RESPIRATION RATE: 15 BRPM | DIASTOLIC BLOOD PRESSURE: 75 MMHG | HEART RATE: 86 BPM | BODY MASS INDEX: 36.15 KG/M2 | SYSTOLIC BLOOD PRESSURE: 104 MMHG | TEMPERATURE: 98.4 F | OXYGEN SATURATION: 98 %

## 2021-05-06 DIAGNOSIS — G47.33 OBSTRUCTIVE SLEEP APNEA (ADULT) (PEDIATRIC): ICD-10-CM

## 2021-05-06 PROCEDURE — 99072 ADDL SUPL MATRL&STAF TM PHE: CPT

## 2021-05-06 PROCEDURE — 99214 OFFICE O/P EST MOD 30 MIN: CPT

## 2021-05-06 NOTE — PHYSICAL EXAM
[Neck Appearance] : the appearance of the neck was normal [Heart Rate And Rhythm] : heart rate was normal and rhythm regular [Murmurs] : no murmurs [Heart Sounds Pericardial Friction Rub] : no pericardial rub [Respiration, Rhythm And Depth] : normal respiratory rhythm and effort [No Focal Deficits] : no focal deficits [Oriented To Time, Place, And Person] : oriented to person, place, and time [Affect] : the affect was normal [General Appearance - Well Developed] : well developed [Normal Appearance] : normal appearance [Normal Conjunctiva] : the conjunctiva exhibited no abnormalities [II] : II [Bowel Sounds] : normal bowel sounds [Abdomen Soft] : soft [Abdomen Tenderness] : non-tender [Abdomen Mass (___ Cm)] : no abdominal mass palpated [Abnormal Walk] : normal gait [Motor Tone] : muscle strength and tone were normal [Skin Color & Pigmentation] : normal skin color and pigmentation [] : no rash [Cranial Nerves] : cranial nerves 2-12 were intact [Motor Exam] : the motor exam was normal [Impaired Insight] : insight and judgment were intact [Mood] : the mood was normal

## 2021-05-06 NOTE — ASSESSMENT
[FreeTextEntry1] : Ms. James is a 30 year old female with severe ERIC here for a follow up. She recently underwent bariatric surgery and has lost 40lbs. Described in detail the effects of weight loss on ERIC but also described that her craniofacial configuration may prohibit her from fully eliminating her ERIC. Would suggest to continue to lose her weight until she has achieved a plateau, at which point a new sleep study would need to be done to determine how bad her ERIC actually is with adequate weight loss. This will help determine her length of CPAP use or possible other ERIC treatment modality. \par \par Patient does complain of her nasal mask being uncomfortable. Will send for a mask fitting. \par \par  Pt counseled to continue using APAP; will arrange for mask fitting as pt finds mask uncomfortable Indications, risks, benefits of mandibular advancement device and Inspire device discussed. She will follow up 60-90 days after she receives her new CPAP mask.

## 2021-05-06 NOTE — HISTORY OF PRESENT ILLNESS
[Obstructive Sleep Apnea] : obstructive sleep apnea [FreeTextEntry1] : Ms. James is a 30 year old female with severe ERIC here for a follow up. AHI > 40. She uses APAP machine. She is s/p bariatric sleeve surgery in March. Tolerated surgery well. No postop complications. States that she feels more tired recently. Has not been using APAP machine. Last use 1 month ago. Finds the nasal  mask uncomfortable. \par \par Goes to bed around 12-1am, wakes up between 6-10AM. Falls asleep within 15 mins. Usually sleeps through the night. States that she snores. Unsure about apneas. Feels tired in AM on wakening. Stopped drinking coffee due to bariatric surgery. No restless leg symptoms. No sleep paralysis, hypnagogic hallucination symptoms. \par \par Denies SOB at rest, CP. Decreased appetite, tolerating new diet. Has lost 45 lbs since surgery and has increased exercise. States that she would like to lose an additional 50 lbs. \par \par EPWORTH SLEEPINESS SCALE \par How likely are you to doze off or fall asleep in the situations described below, in contrast to feeling just tired? \par This refers to your usual way of life in recent times. \par Even if you haven't done some of these things recently, try to work out how they would have affected you. \par Use the following scale to choose one most appropriate number for each situation. \par \par 0 = Never would doze \par 1 = Slight chance of dozing \par 2 = Moderate chance of dozing \par 3 = High chance of dozing \par \par  \par 1........................................Sitting and reading \par 3........................................Watching TV \par 1........................................Sitting inactive in a public place (eg a theatre or a meeting) \par 1........................................As a passenger in a car for an hour without a break \par 3........................................Lying down to rest in the afternoon when circumstances permit \par 0........................................Sitting and talking to someone \par 3........................................Sitting quietly after lunch without alcohol \par 0........................................In a car, while stopped for a few minutes in traffic \par 12........................................TOTAL SCORE \par \par \par

## 2021-05-06 NOTE — REVIEW OF SYSTEMS
[EDS: ESS=____] : daytime somnolence: ESS=[unfilled] [Snoring] : snoring [Obesity] : obesity [A.M. Dry Mouth] : no a.m. dry mouth [Chest Pain] : no chest pain [CHF] : no congestive heart failure [A.M. Headache] : no headache present upon awakening [Sleep Paralysis] : no sleep paralysis [Hypnogogic Hallucinations] : no hypnogogic hallucinations [Hypnopompic Hallucinations] : no hypnopompic hallucinations [Arthralgias] : no arthralgias [Fibromyalgia] : no fibromyalgia [Depression] : no depression [Anxious] : not anxious [Unusual Sleep Behavior] : no unusual sleep behavior

## 2021-08-12 ENCOUNTER — APPOINTMENT (OUTPATIENT)
Dept: ENDOCRINOLOGY | Facility: CLINIC | Age: 31
End: 2021-08-12

## 2021-10-09 NOTE — PHYSICAL EXAM
[No Acute Distress] : no acute distress [Alert] : alert [No Proptosis] : no proptosis [Normal Sclera/Conjunctiva] : normal sclera/conjunctiva [Normal Outer Ear/Nose] : the ears and nose were normal in appearance [Normal Hearing] : hearing was normal [No Respiratory Distress] : no respiratory distress [Normal Gait] : normal gait [No Tremors] : no tremors [Oriented x3] : oriented to person, place, and time [Normal Affect] : the affect was normal [Normal Mood] : the mood was normal Physical Exam    Vital Signs: I have reviewed the initial vital signs.  Constitutional: well-nourished, appears stated age, no acute distress  Eyes: Conjunctiva pink, Sclera clear  Cardiovascular: S1 and S2, regular rate, regular rhythm, well-perfused extremities, radial pulses equal and 2+, pedal pulses 2+ and equal   Respiratory: unlabored respiratory effort, clear to auscultation bilaterally no wheezing, rales and rhonchi  Gastrointestinal: soft, non-tender abdomen, no pulsatile mass, normal bowl sounds  Musculoskeletal: supple neck, no lower extremity edema, no midline tenderness  Integumentary: warm, dry, no rash  Neurologic: awake, alert, nvi

## 2022-06-06 ENCOUNTER — APPOINTMENT (OUTPATIENT)
Dept: INTERNAL MEDICINE | Facility: CLINIC | Age: 32
End: 2022-06-06
Payer: MEDICAID

## 2022-06-06 VITALS
TEMPERATURE: 98.6 F | DIASTOLIC BLOOD PRESSURE: 75 MMHG | SYSTOLIC BLOOD PRESSURE: 112 MMHG | WEIGHT: 197 LBS | HEART RATE: 87 BPM | OXYGEN SATURATION: 97 % | BODY MASS INDEX: 32.82 KG/M2 | HEIGHT: 65 IN

## 2022-06-06 DIAGNOSIS — Z00.00 ENCOUNTER FOR GENERAL ADULT MEDICAL EXAMINATION W/OUT ABNORMAL FINDINGS: ICD-10-CM

## 2022-06-06 DIAGNOSIS — E21.3 HYPERPARATHYROIDISM, UNSPECIFIED: ICD-10-CM

## 2022-06-06 DIAGNOSIS — R79.89 OTHER SPECIFIED ABNORMAL FINDINGS OF BLOOD CHEMISTRY: ICD-10-CM

## 2022-06-06 PROCEDURE — 99395 PREV VISIT EST AGE 18-39: CPT | Mod: 25

## 2022-06-06 NOTE — HISTORY OF PRESENT ILLNESS
[FreeTextEntry1] : cpx [de-identified] : cpx\par s/p gastric sleeve\par no meds\par seeing bariatric surgeon\par never saw hepatology\par off biotin

## 2022-06-07 DIAGNOSIS — E53.8 DEFICIENCY OF OTHER SPECIFIED B GROUP VITAMINS: ICD-10-CM

## 2022-06-07 LAB
25(OH)D3 SERPL-MCNC: 14 NG/ML
ABO + RH PNL BLD: NORMAL
ALBUMIN SERPL ELPH-MCNC: 4.5 G/DL
ALP BLD-CCNC: 54 U/L
ALT SERPL-CCNC: 11 U/L
ANION GAP SERPL CALC-SCNC: 16 MMOL/L
APPEARANCE: CLEAR
AST SERPL-CCNC: 13 U/L
BACTERIA: NEGATIVE
BASOPHILS # BLD AUTO: 0.03 K/UL
BASOPHILS NFR BLD AUTO: 0.5 %
BILIRUB SERPL-MCNC: 0.5 MG/DL
BILIRUBIN URINE: NEGATIVE
BLOOD URINE: NEGATIVE
BUN SERPL-MCNC: 11 MG/DL
CALCIUM SERPL-MCNC: 9.6 MG/DL
CALCIUM SERPL-MCNC: 9.6 MG/DL
CHLORIDE SERPL-SCNC: 104 MMOL/L
CHOLEST SERPL-MCNC: 199 MG/DL
CO2 SERPL-SCNC: 20 MMOL/L
COLOR: YELLOW
CREAT SERPL-MCNC: 0.54 MG/DL
EGFR: 126 ML/MIN/1.73M2
EOSINOPHIL # BLD AUTO: 0.26 K/UL
EOSINOPHIL NFR BLD AUTO: 4.5 %
ESTIMATED AVERAGE GLUCOSE: 111 MG/DL
GLUCOSE QUALITATIVE U: NEGATIVE
GLUCOSE SERPL-MCNC: 96 MG/DL
HBA1C MFR BLD HPLC: 5.5 %
HBV SURFACE AB SER QL: NONREACTIVE
HBV SURFACE AG SER QL: REACTIVE
HCT VFR BLD CALC: 43.6 %
HDLC SERPL-MCNC: 60 MG/DL
HGB BLD-MCNC: 13.9 G/DL
HYALINE CASTS: 3 /LPF
IMM GRANULOCYTES NFR BLD AUTO: 0.3 %
KETONES URINE: NEGATIVE
LDLC SERPL CALC-MCNC: 120 MG/DL
LEUKOCYTE ESTERASE URINE: NEGATIVE
LYMPHOCYTES # BLD AUTO: 1.66 K/UL
LYMPHOCYTES NFR BLD AUTO: 28.7 %
MAN DIFF?: NORMAL
MCHC RBC-ENTMCNC: 29.6 PG
MCHC RBC-ENTMCNC: 31.9 GM/DL
MCV RBC AUTO: 93 FL
MICROSCOPIC-UA: NORMAL
MONOCYTES # BLD AUTO: 0.52 K/UL
MONOCYTES NFR BLD AUTO: 9 %
NEUTROPHILS # BLD AUTO: 3.3 K/UL
NEUTROPHILS NFR BLD AUTO: 57 %
NITRITE URINE: NEGATIVE
NONHDLC SERPL-MCNC: 139 MG/DL
PARATHYROID HORMONE INTACT: 45 PG/ML
PH URINE: 8.5
PHOSPHATE SERPL-MCNC: 3.3 MG/DL
PLATELET # BLD AUTO: 191 K/UL
POTASSIUM SERPL-SCNC: 4.4 MMOL/L
PROT SERPL-MCNC: 6.6 G/DL
PROTEIN URINE: ABNORMAL
RBC # BLD: 4.69 M/UL
RBC # FLD: 12.5 %
RED BLOOD CELLS URINE: 8 /HPF
SODIUM SERPL-SCNC: 140 MMOL/L
SPECIFIC GRAVITY URINE: 1.03
SQUAMOUS EPITHELIAL CELLS: 2 /HPF
T3FREE SERPL-MCNC: 3.17 PG/ML
T4 FREE SERPL-MCNC: 1.4 NG/DL
TRIGL SERPL-MCNC: 95 MG/DL
TSH SERPL-ACNC: 0.78 UIU/ML
UROBILINOGEN URINE: NORMAL
VIT B12 SERPL-MCNC: 227 PG/ML
WBC # FLD AUTO: 5.79 K/UL
WHITE BLOOD CELLS URINE: 0 /HPF

## 2022-06-08 LAB
FOLATE SERPL-MCNC: 8.1 NG/ML
HCYS SERPL-MCNC: 8.7 UMOL/L

## 2022-06-12 LAB
HOMOCYSTEINE LEVEL: 8.7 UMOL/L
IF BLOCK AB SER QL: 1 AU/ML
METHYLMALONIC ACID LEVEL: 252 NMOL/L

## 2022-06-23 ENCOUNTER — APPOINTMENT (OUTPATIENT)
Dept: HEPATOLOGY | Facility: CLINIC | Age: 32
End: 2022-06-23
Payer: MEDICAID

## 2022-06-23 VITALS
HEART RATE: 55 BPM | WEIGHT: 199 LBS | HEIGHT: 65 IN | TEMPERATURE: 98 F | SYSTOLIC BLOOD PRESSURE: 130 MMHG | BODY MASS INDEX: 33.15 KG/M2 | OXYGEN SATURATION: 97 % | DIASTOLIC BLOOD PRESSURE: 82 MMHG

## 2022-06-23 DIAGNOSIS — K76.0 FATTY (CHANGE OF) LIVER, NOT ELSEWHERE CLASSIFIED: ICD-10-CM

## 2022-06-23 DIAGNOSIS — B19.10 UNSPECIFIED VIRAL HEPATITIS B W/OUT HEPATIC COMA: ICD-10-CM

## 2022-06-23 PROCEDURE — 99204 OFFICE O/P NEW MOD 45 MIN: CPT

## 2022-06-23 RX ORDER — METFORMIN HYDROCHLORIDE 500 MG/1
500 TABLET, COATED ORAL DAILY
Qty: 30 | Refills: 0 | Status: DISCONTINUED | COMMUNITY
Start: 2020-06-04 | End: 2022-06-23

## 2022-06-23 RX ORDER — MULTIVITAMIN
TABLET ORAL
Refills: 0 | Status: ACTIVE | COMMUNITY

## 2022-06-23 NOTE — PHYSICAL EXAM
[Scleral Icterus] : No Scleral Icterus [Asterixis] : no asterixis observed [Jaundice] : No jaundice [Palmar Erythema] : no Palmar Erythema [General Appearance - Alert] : alert [General Appearance - In No Acute Distress] : in no acute distress [Sclera] : the sclera and conjunctiva were normal [] : no respiratory distress [Auscultation Breath Sounds / Voice Sounds] : lungs were clear to auscultation bilaterally [Heart Rate And Rhythm] : heart rate was normal and rhythm regular [Heart Sounds] : normal S1 and S2 [Edema] : there was no peripheral edema [Bowel Sounds] : normal bowel sounds [Abdomen Soft] : soft [Abdomen Tenderness] : non-tender [Abdomen Mass (___ Cm)] : no abdominal mass palpated [Nail Clubbing] : no clubbing  or cyanosis of the fingernails [Skin Turgor] : normal skin turgor [Oriented To Time, Place, And Person] : oriented to person, place, and time

## 2022-06-23 NOTE — HISTORY OF PRESENT ILLNESS
[de-identified] : Sonya James 31 yoF with pmhx PCOS and obesity here for further evaluation of hepatitis B. Reports that  she was dx with HBV more than 20 yrs ago. She was treated with antiviral for about 5-6 yrs in her teens but stopped on her own. She denies history of jaundice or decompensated liver disease in the past. She has been told that she has fatty liver. S/p gastric sleeve in April 2022, her heaviest weight was about 290 now 199 lbs. Doing exercise. \par \par She was born in the [place] but lived in [place] until age 4. States that her siblings and parents do not have HBV. She was vaccinated for HBV at birth but don't think that she completed the vaccination series. \par \par She does not drink alcohol. \par \par BW from 6/6/22 ALT 11, AST 13, HBsAg pos, HBsAb neg. In 2020 BW showed HBsAg pos, HBVDNA not detected, HBeAg neg, HBeAb neg

## 2022-06-23 NOTE — ASSESSMENT
[FreeTextEntry1] : Sonya James 31 yoF with pmhx PCOS and obesity here for further evaluation of hepatitis B. \par \par #Hepatitis B\par -Was dx with HBV more than 20 yrs ago. She was treated with antiviral for about 5-6 yrs in her teens but stopped on her own. Her recent BW in June showed normal liver enzymes with HBsAg positive. In 2020 her HBsAg was pos but HBVDNA was not detected with both HBeAg HBeAb neg. Unclear if she has seroconversion. BW ordered to check HBV serologies. Will get abdo US to screen for HCC. \par -FibroScan ordered for non-invasive estimation of degree of steatosis and stage of fibrosis \par -Discussed at length regarding hepatitis B. We spoke about the natural history, modes of transmission, diagnostic evaluation and treatment options. I have recommended that all household and close contacts be checked for hepatitis B. If they are not immune, they should be vaccinated. I have explained the risk of development of liver cancer and have recommended imaging every 6 months to screen for primary hepatocellular carcinoma\par \par #Fatty liver\par -She has been told that she has fatty liver. Risks are PCOS and BMI >30. S/p gastric sleeve in April 2022, her heaviest weight was about 290 now 199 lbs. Doing exercise. \par - We discussed fatty liver at length today. I reviewed the natural history, evaluation and staging of the disease. We also discussed lifestyle modifications for management of NAFLD. I recommended gradual weight loss of 5-10% of her body weight. I recommended increased consumption of vegetables and lean proteins, avoidance of red meat and high fructose corn syrup, and avoidance of calorie-containing beverages. I recommended moderate intensity exercise for a minimum of 20-30 minutes at least 3 times per week. These changes have been shown to lead to regression or even resolution of steatosis, inflammation, and even fibrosis in some patients. \par \par Plan:\par BW, fibroscan test, abdo US, F/U 3-4 weeks \par

## 2022-07-20 ENCOUNTER — APPOINTMENT (OUTPATIENT)
Dept: ULTRASOUND IMAGING | Facility: CLINIC | Age: 32
End: 2022-07-20

## 2022-07-20 ENCOUNTER — OUTPATIENT (OUTPATIENT)
Dept: OUTPATIENT SERVICES | Facility: HOSPITAL | Age: 32
LOS: 1 days | End: 2022-07-20
Payer: MEDICAID

## 2022-07-20 DIAGNOSIS — N83.209 UNSPECIFIED OVARIAN CYST, UNSPECIFIED SIDE: Chronic | ICD-10-CM

## 2022-07-20 DIAGNOSIS — Z00.8 ENCOUNTER FOR OTHER GENERAL EXAMINATION: ICD-10-CM

## 2022-07-20 PROCEDURE — 76700 US EXAM ABDOM COMPLETE: CPT

## 2022-07-20 PROCEDURE — 76700 US EXAM ABDOM COMPLETE: CPT | Mod: 26

## 2022-08-08 ENCOUNTER — APPOINTMENT (OUTPATIENT)
Dept: HEPATOLOGY | Facility: CLINIC | Age: 32
End: 2022-08-08

## 2022-08-31 ENCOUNTER — APPOINTMENT (OUTPATIENT)
Dept: HEPATOLOGY | Facility: CLINIC | Age: 32
End: 2022-08-31
